# Patient Record
Sex: MALE | Race: WHITE | NOT HISPANIC OR LATINO | Employment: FULL TIME | URBAN - METROPOLITAN AREA
[De-identification: names, ages, dates, MRNs, and addresses within clinical notes are randomized per-mention and may not be internally consistent; named-entity substitution may affect disease eponyms.]

---

## 2017-11-03 ENCOUNTER — RECORDS - HEALTHEAST (OUTPATIENT)
Dept: LAB | Facility: CLINIC | Age: 47
End: 2017-11-03

## 2017-11-03 LAB
CHOLEST SERPL-MCNC: 165 MG/DL
FASTING STATUS PATIENT QL REPORTED: NORMAL
HDLC SERPL-MCNC: 52 MG/DL
LDLC SERPL CALC-MCNC: 84 MG/DL
TRIGL SERPL-MCNC: 144 MG/DL

## 2019-11-04 ENCOUNTER — RECORDS - HEALTHEAST (OUTPATIENT)
Dept: LAB | Facility: CLINIC | Age: 49
End: 2019-11-04

## 2019-11-04 LAB
ALBUMIN SERPL-MCNC: 4.1 G/DL (ref 3.5–5)
ALP SERPL-CCNC: 85 U/L (ref 45–120)
ALT SERPL W P-5'-P-CCNC: 40 U/L (ref 0–45)
ANION GAP SERPL CALCULATED.3IONS-SCNC: 7 MMOL/L (ref 5–18)
AST SERPL W P-5'-P-CCNC: 27 U/L (ref 0–40)
BILIRUB SERPL-MCNC: 0.4 MG/DL (ref 0–1)
BUN SERPL-MCNC: 17 MG/DL (ref 8–22)
CALCIUM SERPL-MCNC: 9.5 MG/DL (ref 8.5–10.5)
CHLORIDE BLD-SCNC: 106 MMOL/L (ref 98–107)
CHOLEST SERPL-MCNC: 176 MG/DL
CO2 SERPL-SCNC: 27 MMOL/L (ref 22–31)
CREAT SERPL-MCNC: 1 MG/DL (ref 0.7–1.3)
FASTING STATUS PATIENT QL REPORTED: ABNORMAL
GFR SERPL CREATININE-BSD FRML MDRD: >60 ML/MIN/1.73M2
GLUCOSE BLD-MCNC: 94 MG/DL (ref 70–125)
HDLC SERPL-MCNC: 48 MG/DL
LDLC SERPL CALC-MCNC: 80 MG/DL
POTASSIUM BLD-SCNC: 4 MMOL/L (ref 3.5–5)
PROT SERPL-MCNC: 6.5 G/DL (ref 6–8)
SODIUM SERPL-SCNC: 140 MMOL/L (ref 136–145)
TRIGL SERPL-MCNC: 239 MG/DL

## 2020-11-04 ENCOUNTER — RECORDS - HEALTHEAST (OUTPATIENT)
Dept: LAB | Facility: CLINIC | Age: 50
End: 2020-11-04

## 2020-11-04 LAB
CHOLEST SERPL-MCNC: 185 MG/DL
FASTING STATUS PATIENT QL REPORTED: ABNORMAL
HDLC SERPL-MCNC: 49 MG/DL
LDLC SERPL CALC-MCNC: 92 MG/DL
TRIGL SERPL-MCNC: 218 MG/DL

## 2022-06-17 ENCOUNTER — LAB REQUISITION (OUTPATIENT)
Dept: LAB | Facility: CLINIC | Age: 52
End: 2022-06-17
Payer: COMMERCIAL

## 2022-06-17 DIAGNOSIS — E78.49 OTHER HYPERLIPIDEMIA: ICD-10-CM

## 2022-06-17 LAB
CHOLEST SERPL-MCNC: 158 MG/DL
HDLC SERPL-MCNC: 41 MG/DL
LDLC SERPL CALC-MCNC: 82 MG/DL
TRIGL SERPL-MCNC: 175 MG/DL

## 2022-06-17 PROCEDURE — 80061 LIPID PANEL: CPT | Mod: ORL | Performed by: PHYSICIAN ASSISTANT

## 2022-08-25 ENCOUNTER — LAB REQUISITION (OUTPATIENT)
Dept: LAB | Facility: CLINIC | Age: 52
End: 2022-08-25
Payer: COMMERCIAL

## 2022-08-25 DIAGNOSIS — R53.82 CHRONIC FATIGUE, UNSPECIFIED: ICD-10-CM

## 2022-08-25 DIAGNOSIS — M79.10 MYALGIA, UNSPECIFIED SITE: ICD-10-CM

## 2022-08-25 DIAGNOSIS — Z12.5 ENCOUNTER FOR SCREENING FOR MALIGNANT NEOPLASM OF PROSTATE: ICD-10-CM

## 2022-08-25 LAB
ALBUMIN SERPL BCG-MCNC: 4.6 G/DL (ref 3.5–5.2)
ALP SERPL-CCNC: 85 U/L (ref 40–129)
ALT SERPL W P-5'-P-CCNC: 80 U/L (ref 10–50)
ANION GAP SERPL CALCULATED.3IONS-SCNC: 9 MMOL/L (ref 7–15)
AST SERPL W P-5'-P-CCNC: 42 U/L (ref 10–50)
BASOPHILS # BLD AUTO: 0 10E3/UL (ref 0–0.2)
BASOPHILS NFR BLD AUTO: 1 %
BILIRUB SERPL-MCNC: 0.3 MG/DL
BUN SERPL-MCNC: 20.1 MG/DL (ref 6–20)
CALCIUM SERPL-MCNC: 9.5 MG/DL (ref 8.6–10)
CHLORIDE SERPL-SCNC: 103 MMOL/L (ref 98–107)
CK SERPL-CCNC: 73 U/L (ref 39–308)
CREAT SERPL-MCNC: 0.98 MG/DL (ref 0.67–1.17)
DEPRECATED HCO3 PLAS-SCNC: 28 MMOL/L (ref 22–29)
EOSINOPHIL # BLD AUTO: 0.2 10E3/UL (ref 0–0.7)
EOSINOPHIL NFR BLD AUTO: 3 %
ERYTHROCYTE [DISTWIDTH] IN BLOOD BY AUTOMATED COUNT: 12 % (ref 10–15)
GFR SERPL CREATININE-BSD FRML MDRD: >90 ML/MIN/1.73M2
GLUCOSE SERPL-MCNC: 99 MG/DL (ref 70–99)
HCT VFR BLD AUTO: 42.4 % (ref 40–53)
HGB BLD-MCNC: 14.4 G/DL (ref 13.3–17.7)
IMM GRANULOCYTES # BLD: 0 10E3/UL
IMM GRANULOCYTES NFR BLD: 0 %
LYMPHOCYTES # BLD AUTO: 1.9 10E3/UL (ref 0.8–5.3)
LYMPHOCYTES NFR BLD AUTO: 29 %
MCH RBC QN AUTO: 29.4 PG (ref 26.5–33)
MCHC RBC AUTO-ENTMCNC: 34 G/DL (ref 31.5–36.5)
MCV RBC AUTO: 87 FL (ref 78–100)
MONOCYTES # BLD AUTO: 0.6 10E3/UL (ref 0–1.3)
MONOCYTES NFR BLD AUTO: 9 %
NEUTROPHILS # BLD AUTO: 3.7 10E3/UL (ref 1.6–8.3)
NEUTROPHILS NFR BLD AUTO: 58 %
NRBC # BLD AUTO: 0 10E3/UL
NRBC BLD AUTO-RTO: 0 /100
PLATELET # BLD AUTO: 344 10E3/UL (ref 150–450)
POTASSIUM SERPL-SCNC: 4.3 MMOL/L (ref 3.4–5.3)
PROT SERPL-MCNC: 6.4 G/DL (ref 6.4–8.3)
PSA SERPL-MCNC: 1.91 NG/ML (ref 0–3.5)
RBC # BLD AUTO: 4.9 10E6/UL (ref 4.4–5.9)
SODIUM SERPL-SCNC: 140 MMOL/L (ref 136–145)
TSH SERPL DL<=0.005 MIU/L-ACNC: 2.5 UIU/ML (ref 0.3–4.2)
WBC # BLD AUTO: 6.3 10E3/UL (ref 4–11)

## 2022-08-25 PROCEDURE — 84270 ASSAY OF SEX HORMONE GLOBUL: CPT | Mod: ORL | Performed by: FAMILY MEDICINE

## 2022-08-25 PROCEDURE — 82550 ASSAY OF CK (CPK): CPT | Mod: ORL | Performed by: FAMILY MEDICINE

## 2022-08-25 PROCEDURE — 84403 ASSAY OF TOTAL TESTOSTERONE: CPT | Mod: ORL | Performed by: FAMILY MEDICINE

## 2022-08-25 PROCEDURE — 85025 COMPLETE CBC W/AUTO DIFF WBC: CPT | Mod: ORL | Performed by: FAMILY MEDICINE

## 2022-08-25 PROCEDURE — G0103 PSA SCREENING: HCPCS | Mod: ORL | Performed by: FAMILY MEDICINE

## 2022-08-25 PROCEDURE — 80053 COMPREHEN METABOLIC PANEL: CPT | Mod: ORL | Performed by: FAMILY MEDICINE

## 2022-08-25 PROCEDURE — 84443 ASSAY THYROID STIM HORMONE: CPT | Mod: ORL | Performed by: FAMILY MEDICINE

## 2022-08-26 LAB — SHBG SERPL-SCNC: 35 NMOL/L (ref 11–80)

## 2022-08-29 LAB
TESTOST FREE SERPL-MCNC: 6.03 NG/DL
TESTOST SERPL-MCNC: 316 NG/DL (ref 240–950)

## 2023-11-25 ENCOUNTER — APPOINTMENT (OUTPATIENT)
Dept: ULTRASOUND IMAGING | Facility: HOSPITAL | Age: 53
DRG: 176 | End: 2023-11-25
Attending: INTERNAL MEDICINE
Payer: COMMERCIAL

## 2023-11-25 ENCOUNTER — HOSPITAL ENCOUNTER (INPATIENT)
Facility: HOSPITAL | Age: 53
LOS: 1 days | Discharge: HOME | DRG: 176 | End: 2023-11-26
Attending: EMERGENCY MEDICINE | Admitting: INTERNAL MEDICINE
Payer: COMMERCIAL

## 2023-11-25 ENCOUNTER — APPOINTMENT (OUTPATIENT)
Dept: CARDIOLOGY | Facility: HOSPITAL | Age: 53
DRG: 176 | End: 2023-11-25
Attending: INTERNAL MEDICINE
Payer: COMMERCIAL

## 2023-11-25 ENCOUNTER — APPOINTMENT (OUTPATIENT)
Dept: CT IMAGING | Facility: HOSPITAL | Age: 53
DRG: 176 | End: 2023-11-25
Attending: EMERGENCY MEDICINE
Payer: COMMERCIAL

## 2023-11-25 DIAGNOSIS — I26.94 MULTIPLE SUBSEGMENTAL PULMONARY EMBOLI WITHOUT ACUTE COR PULMONALE (H): ICD-10-CM

## 2023-11-25 DIAGNOSIS — I82.442 ACUTE DEEP VEIN THROMBOSIS (DVT) OF TIBIAL VEIN OF LEFT LOWER EXTREMITY (H): Primary | ICD-10-CM

## 2023-11-25 PROBLEM — E78.49 OTHER HYPERLIPIDEMIA: Status: ACTIVE | Noted: 2023-11-25

## 2023-11-25 PROBLEM — R53.82 CHRONIC FATIGUE: Status: ACTIVE | Noted: 2023-11-25

## 2023-11-25 PROBLEM — G57.62 MORTON'S NEUROMA OF LEFT FOOT: Status: ACTIVE | Noted: 2023-11-25

## 2023-11-25 PROBLEM — N52.9 MALE ERECTILE DYSFUNCTION, UNSPECIFIED: Status: ACTIVE | Noted: 2023-11-25

## 2023-11-25 LAB
ALBUMIN SERPL BCG-MCNC: 4.1 G/DL (ref 3.5–5.2)
ALP SERPL-CCNC: 83 U/L (ref 40–150)
ALT SERPL W P-5'-P-CCNC: 42 U/L (ref 0–70)
ANION GAP SERPL CALCULATED.3IONS-SCNC: 14 MMOL/L (ref 7–15)
AST SERPL W P-5'-P-CCNC: 36 U/L (ref 0–45)
BASOPHILS # BLD AUTO: 0 10E3/UL (ref 0–0.2)
BASOPHILS NFR BLD AUTO: 1 %
BILIRUB SERPL-MCNC: 0.4 MG/DL
BUN SERPL-MCNC: 27.5 MG/DL (ref 6–20)
CALCIUM SERPL-MCNC: 8.4 MG/DL (ref 8.6–10)
CHLORIDE SERPL-SCNC: 104 MMOL/L (ref 98–107)
CREAT SERPL-MCNC: 1.09 MG/DL (ref 0.67–1.17)
D DIMER PPP FEU-MCNC: 2.27 UG/ML FEU (ref 0–0.5)
DEPRECATED HCO3 PLAS-SCNC: 21 MMOL/L (ref 22–29)
EGFRCR SERPLBLD CKD-EPI 2021: 81 ML/MIN/1.73M2
EOSINOPHIL # BLD AUTO: 0.1 10E3/UL (ref 0–0.7)
EOSINOPHIL NFR BLD AUTO: 1 %
ERYTHROCYTE [DISTWIDTH] IN BLOOD BY AUTOMATED COUNT: 11.6 % (ref 10–15)
GLUCOSE SERPL-MCNC: 179 MG/DL (ref 70–99)
HCT VFR BLD AUTO: 40.1 % (ref 40–53)
HGB BLD-MCNC: 14.4 G/DL (ref 13.3–17.7)
IMM GRANULOCYTES # BLD: 0 10E3/UL
IMM GRANULOCYTES NFR BLD: 0 %
LIPASE SERPL-CCNC: 44 U/L (ref 13–60)
LVEF ECHO: NORMAL
LYMPHOCYTES # BLD AUTO: 1.6 10E3/UL (ref 0.8–5.3)
LYMPHOCYTES NFR BLD AUTO: 20 %
MCH RBC QN AUTO: 30.3 PG (ref 26.5–33)
MCHC RBC AUTO-ENTMCNC: 35.9 G/DL (ref 31.5–36.5)
MCV RBC AUTO: 84 FL (ref 78–100)
MONOCYTES # BLD AUTO: 0.6 10E3/UL (ref 0–1.3)
MONOCYTES NFR BLD AUTO: 7 %
NEUTROPHILS # BLD AUTO: 5.8 10E3/UL (ref 1.6–8.3)
NEUTROPHILS NFR BLD AUTO: 71 %
NRBC # BLD AUTO: 0 10E3/UL
NRBC BLD AUTO-RTO: 0 /100
NT-PROBNP SERPL-MCNC: 876 PG/ML (ref 0–900)
PLATELET # BLD AUTO: 235 10E3/UL (ref 150–450)
POTASSIUM SERPL-SCNC: 3.8 MMOL/L (ref 3.4–5.3)
PROT SERPL-MCNC: 6 G/DL (ref 6.4–8.3)
RADIOLOGIST FLAGS: ABNORMAL
RBC # BLD AUTO: 4.75 10E6/UL (ref 4.4–5.9)
SODIUM SERPL-SCNC: 139 MMOL/L (ref 135–145)
TROPONIN T SERPL HS-MCNC: 40 NG/L
TROPONIN T SERPL HS-MCNC: 58 NG/L
UFH PPP CHRO-ACNC: >1.1 IU/ML
WBC # BLD AUTO: 8.1 10E3/UL (ref 4–11)

## 2023-11-25 PROCEDURE — 999N000208 ECHOCARDIOGRAM COMPLETE

## 2023-11-25 PROCEDURE — 250N000013 HC RX MED GY IP 250 OP 250 PS 637: Performed by: INTERNAL MEDICINE

## 2023-11-25 PROCEDURE — 85520 HEPARIN ASSAY: CPT | Performed by: EMERGENCY MEDICINE

## 2023-11-25 PROCEDURE — 83880 ASSAY OF NATRIURETIC PEPTIDE: CPT | Performed by: EMERGENCY MEDICINE

## 2023-11-25 PROCEDURE — 84484 ASSAY OF TROPONIN QUANT: CPT | Performed by: EMERGENCY MEDICINE

## 2023-11-25 PROCEDURE — 85379 FIBRIN DEGRADATION QUANT: CPT | Performed by: EMERGENCY MEDICINE

## 2023-11-25 PROCEDURE — 93306 TTE W/DOPPLER COMPLETE: CPT | Mod: 26 | Performed by: INTERNAL MEDICINE

## 2023-11-25 PROCEDURE — 80053 COMPREHEN METABOLIC PANEL: CPT | Performed by: EMERGENCY MEDICINE

## 2023-11-25 PROCEDURE — 96365 THER/PROPH/DIAG IV INF INIT: CPT

## 2023-11-25 PROCEDURE — 255N000002 HC RX 255 OP 636: Performed by: EMERGENCY MEDICINE

## 2023-11-25 PROCEDURE — 36415 COLL VENOUS BLD VENIPUNCTURE: CPT | Performed by: EMERGENCY MEDICINE

## 2023-11-25 PROCEDURE — 210N000001 HC R&B IMCU HEART CARE

## 2023-11-25 PROCEDURE — 93970 EXTREMITY STUDY: CPT

## 2023-11-25 PROCEDURE — G0378 HOSPITAL OBSERVATION PER HR: HCPCS

## 2023-11-25 PROCEDURE — 258N000003 HC RX IP 258 OP 636: Performed by: EMERGENCY MEDICINE

## 2023-11-25 PROCEDURE — 71275 CT ANGIOGRAPHY CHEST: CPT

## 2023-11-25 PROCEDURE — 250N000011 HC RX IP 250 OP 636: Performed by: EMERGENCY MEDICINE

## 2023-11-25 PROCEDURE — 99221 1ST HOSP IP/OBS SF/LOW 40: CPT | Mod: AI | Performed by: INTERNAL MEDICINE

## 2023-11-25 PROCEDURE — 93005 ELECTROCARDIOGRAM TRACING: CPT | Performed by: EMERGENCY MEDICINE

## 2023-11-25 PROCEDURE — 250N000013 HC RX MED GY IP 250 OP 250 PS 637: Performed by: EMERGENCY MEDICINE

## 2023-11-25 PROCEDURE — 250N000011 HC RX IP 250 OP 636: Mod: JZ | Performed by: EMERGENCY MEDICINE

## 2023-11-25 PROCEDURE — 83690 ASSAY OF LIPASE: CPT | Performed by: EMERGENCY MEDICINE

## 2023-11-25 PROCEDURE — 85025 COMPLETE CBC W/AUTO DIFF WBC: CPT | Performed by: EMERGENCY MEDICINE

## 2023-11-25 PROCEDURE — 99222 1ST HOSP IP/OBS MODERATE 55: CPT | Performed by: INTERNAL MEDICINE

## 2023-11-25 PROCEDURE — 99285 EMERGENCY DEPT VISIT HI MDM: CPT | Mod: 25

## 2023-11-25 PROCEDURE — 96366 THER/PROPH/DIAG IV INF ADDON: CPT

## 2023-11-25 RX ORDER — SILDENAFIL 100 MG/1
100 TABLET, FILM COATED ORAL DAILY PRN
COMMUNITY

## 2023-11-25 RX ORDER — ATORVASTATIN CALCIUM 10 MG/1
10 TABLET, FILM COATED ORAL AT BEDTIME
COMMUNITY
Start: 2021-12-15

## 2023-11-25 RX ORDER — ASPIRIN 325 MG
325 TABLET ORAL ONCE
Status: COMPLETED | OUTPATIENT
Start: 2023-11-25 | End: 2023-11-25

## 2023-11-25 RX ORDER — MULTIVITAMIN,THERAPEUTIC
1 TABLET ORAL AT BEDTIME
COMMUNITY
End: 2024-01-09

## 2023-11-25 RX ORDER — NAPROXEN SODIUM 220 MG
2 TABLET ORAL DAILY PRN
Status: ON HOLD | COMMUNITY
End: 2023-11-26

## 2023-11-25 RX ORDER — IOPAMIDOL 755 MG/ML
75 INJECTION, SOLUTION INTRAVASCULAR ONCE
Status: COMPLETED | OUTPATIENT
Start: 2023-11-25 | End: 2023-11-25

## 2023-11-25 RX ORDER — ATORVASTATIN CALCIUM 10 MG/1
10 TABLET, FILM COATED ORAL AT BEDTIME
Status: DISCONTINUED | OUTPATIENT
Start: 2023-11-25 | End: 2023-11-26 | Stop reason: HOSPADM

## 2023-11-25 RX ORDER — HEPARIN SODIUM 10000 [USP'U]/100ML
0-5000 INJECTION, SOLUTION INTRAVENOUS CONTINUOUS
Status: DISPENSED | OUTPATIENT
Start: 2023-11-25 | End: 2023-11-25

## 2023-11-25 RX ADMIN — PERFLUTREN 2 ML: 6.52 INJECTION, SUSPENSION INTRAVENOUS at 14:10

## 2023-11-25 RX ADMIN — ASPIRIN 325 MG ORAL TABLET 325 MG: 325 PILL ORAL at 10:46

## 2023-11-25 RX ADMIN — RIVAROXABAN 15 MG: 15 TABLET, FILM COATED ORAL at 21:47

## 2023-11-25 RX ADMIN — HEPARIN SODIUM AND DEXTROSE 1700 UNITS/HR: 10000; 5 INJECTION INTRAVENOUS at 12:49

## 2023-11-25 RX ADMIN — ATORVASTATIN CALCIUM 10 MG: 10 TABLET, FILM COATED ORAL at 20:07

## 2023-11-25 RX ADMIN — SODIUM CHLORIDE 500 ML: 9 INJECTION, SOLUTION INTRAVENOUS at 10:47

## 2023-11-25 RX ADMIN — IOPAMIDOL 75 ML: 755 INJECTION, SOLUTION INTRAVENOUS at 11:34

## 2023-11-25 ASSESSMENT — ACTIVITIES OF DAILY LIVING (ADL)
ADLS_ACUITY_SCORE: 35
ADLS_ACUITY_SCORE: 18
ADLS_ACUITY_SCORE: 35
ADLS_ACUITY_SCORE: 18
ADLS_ACUITY_SCORE: 35
DEPENDENT_IADLS:: INDEPENDENT
ADLS_ACUITY_SCORE: 18
ADLS_ACUITY_SCORE: 18

## 2023-11-25 NOTE — ED PROVIDER NOTES
EMERGENCY DEPARTMENT ENCOUNTER      NAME: Alan Thapa  AGE: 53 year old male  YOB: 1970  MRN: [id number]  EVALUATION DATE & TIME: 11/25/2023 10:25 AM    PCP: No primary care provider on file.    ED PROVIDER: Abi Van MD      Chief Complaint   Patient presents with    Chest Pain    Shortness of Breath         FINAL IMPRESSION:  1. Multiple subsegmental pulmonary emboli without acute cor pulmonale (H)          ED COURSE & MEDICAL DECISION MAKING:    Pertinent Labs & Imaging studies reviewed. (See chart for details)    10:30 AM I introduced myself to the patient, obtained patient history, performed a physical exam, and discussed plan for ED workup including potential diagnostic laboratory/imaging studies and interventions.  12:27 PM I rechecked and updated the patient.  12:31 PM I spoke to Dr. Minal Thompson, Hospitalist, who agrees to take the patient.  12:52 PM I spoke to Dr. Figueredo, IR.    53 year old male with a pertinent history of high cholesterol who presents to this ED for evaluation of chest pain and shortness of breath.  On exam, patient is overall well-appearing in no acute distress.  Vital signs here are within normal limits and he is afebrile.  Differential diagnosis includes but is not limited to acute coronary syndrome, PE, aortic dissection, pneumonia, pulmonary edema, musculoskeletal etiology, GERD, etc.  EKG reveals Q waves in the inferior leads without signs of ST elevation or depression.  There is also an incomplete right bundle branch block.  Do not have an old EKG to compare to.  Did consider potential PE with these EKG changes.  D-dimer obtained which was elevated at 2.27.  CMP largely unremarkable.  CBC reveals a normal white blood cell of 8.1 with hemoglobin 14.4.  CT of the chest obtained.  Lipase within normal limits.  .  Initial troponin was 40 and on repeat is 58.  Repeat EKG without signs of acute ischemia.  Given 3 intrafamily grams of aspirin here.  Had  initially started gentle IV fluids which were stopped once notified that the CT showed PE. No evidence of dissection.     CT shows bilateral lobar interlobar and segmental pulmonary emboli with moderate clot burden.  No sign of saddle PE per discussion with the radiologist.  There is some CT evidence of right heart strain.  IV heparin bolus and drip started.  Discussed the patient with the hospitalist who accepted for admission and asked that I speak with IR.  Discussed with IR as below.  Patient in agreement with the plan for admission.  Patient was admitted in stable condition. He remained hemodynamically stable.     ED Course as of 11/25/23 1255   Sat Nov 25, 2023   1252 Spoke with Dr. Figueredo with IR who did not feel that the patient would warrant any emergent IR intervention.  He did not feel that he would warrant lytics at this point with this being peripheral and being hemodynamically stable.  Recommended getting the echocardiogram and if there was signs of significant heart strain on this to have the hospitalist contact him back to evaluate the patient but he was comfortable with the patient staying here at [facility].       At the conclusion of the encounter I discussed the results of all of the tests and the disposition. The questions were answered. The patient or family acknowledged understanding and was agreeable with the care plan.         Medical Decision Making    History:  Supplemental history from: Family Member/Significant Other  External Record(s) reviewed: Documented in chart, if applicable.    Work Up:  Chart documentation includes differential considered and any EKGs or imaging independently interpreted by provider.  In additional to work up documented, I considered the following work up: Documented in chart, if applicable.    External consultation:  Discussion of management with another provider: Hospitalist and Interventional Radiology    Complicating factors:  Care impacted by chronic  "illness: Other: high cholesterol  Care affected by social determinants of health: N/A    Disposition considerations: Admit.      MEDICATIONS GIVEN IN THE EMERGENCY:  Medications   heparin infusion 25,000 units in D5W 250 mL ANTICOAGULANT (1,700 Units/hr Intravenous $New Bag 23 1249)   aspirin (ASA) tablet 325 mg (325 mg Oral $Given 23 1046)   sodium chloride 0.9% BOLUS 500 mL (0 mLs Intravenous Stopped 23 1113)   iopamidol (ISOVUE-370) solution 75 mL (75 mLs Intravenous $Given 23 1134)   heparin ANTICOAGULANT Loading dose for HIGH INTENSITY TREATMENT * Give BEFORE starting heparin infusion (7,600 Units Intravenous $Given 23 1248)       NEW PRESCRIPTIONS STARTED AT TODAY'S ER VISIT  New Prescriptions    No medications on file          =================================================================    HPI    Patient information was obtained from: Patient and patient's wife    Use of : N/A         Alan Thapa is a 53 year old male with a pertinent history of high cholesterol who presents to this ED for evaluation of chest pain and shortness of breath.    Patient reports playing tennis both last night and this morning when he experienced some chest pain, says it felt \"sharper\" and like a muscle strain. He notes that it went away in between these episodes but the one this morning was accompanied by some lightheadedness around 9:30 AM. He said he had some shortness of breath and sweating during these episodes as well, but thought it was just due to playing tennis. Patient notes that he has felt winded for the past couple days and is leaving Wednesday to travel out of the country. He notes that his maternal grandfather  in his 40 - 50s due to heart issues and that he was a smoker. Otherwise, patient denies any blood clots, leg swelling, palpitations, family history of atrial fibrillation, syncope, pain radiation, experiencing anything like this before, any numbness, " "tingling, weakness, abdominal pain, nausea, fever, cough, vomiting, diarrhea, heart issues, family history of heart attack, smoking, diabetes, or any recent travel. No other complaints at this time.       REVIEW OF SYSTEMS   Review of Systems   Pertinent positives and negatives are documented in the HPI. All other systems reviewed and are negative.      PAST MEDICAL HISTORY:  No past medical history on file.    PAST SURGICAL HISTORY:  No past surgical history on file.        CURRENT MEDICATIONS:    atorvastatin (LIPITOR) 10 MG tablet  multivitamin, therapeutic (THERA-VIT) TABS tablet  naproxen sodium (ANAPROX) 220 MG tablet  sildenafil (VIAGRA) 100 MG tablet        ALLERGIES:  No Known Allergies    FAMILY HISTORY:  No family history on file.    SOCIAL HISTORY:        VITALS:  /85   Pulse 74   Temp 97.7  F (36.5  C) (Oral)   Resp 22   Ht 1.854 m (6' 1\")   Wt 95.3 kg (210 lb)   SpO2 97%   BMI 27.71 kg/m      PHYSICAL EXAM    Physical Exam  Constitutional: Well developed, Well nourished, NAD, GCS 15  HENT: Normocephalic, Atraumatic, Bilateral external ears normal, Oropharynx normal, mucous membranes moist, Nose normal. Neck-  Normal range of motion, No tenderness, Supple, No stridor.    Eyes: PERRL, EOMI, Conjunctiva normal, No discharge.   Respiratory: Normal breath sounds, No respiratory distress, No wheezing or crackles, Speaks in full sentences easily.    Cardiovascular: Normal heart rate, Regular rhythm,  No murmurs, No rubs, No gallops. 2+ radial pulses bilaterally  GI: Bowel sounds normal, Soft, No tenderness, No masses, No rebound or guarding.   Musculoskeletal: 2+ DP pulses. No notable lower extremity edema.  No cyanosis, No clubbing. Good range of motion in all major joints. No tenderness to palpation or major deformities noted.  Integument: Warm, Dry, No erythema, No rash. No petechiae.   Neurologic: Alert & oriented x 3, 5/5 strength in all 4 extremities bilaterally. Sensation intact to light " touch in all 4 extremities and the face bilaterally. No focal deficits noted.   Psychiatric: Affect normal, Judgment normal, Mood normal. Cooperative.      LAB:  All pertinent labs reviewed and interpreted.  Results for orders placed or performed during the hospital encounter of 11/25/23   CT Chest Pulmonary Embolism w Contrast   Result Value Ref Range    Radiologist flags New diagnosis of pulmonary embolism (AA)     Impression    IMPRESSION:  1.  Bilateral lobar, interlobar, and segmental pulmonary emboli with moderate clot burden.  2.  Evidence of right heart strain.  3.  3 mm perifissural nodule in the left lower lobe. No follow-up needed per Fleischner Society guidelines.      [Critical Result: New diagnosis of pulmonary embolism]    Finding was identified on 11/25/2023 12:08 PM CST.       Dr. Van was contacted by me on 11/25/2023 12:13 PM CST and verbalized understanding of the critical result.      Comprehensive metabolic panel   Result Value Ref Range    Sodium 139 135 - 145 mmol/L    Potassium 3.8 3.4 - 5.3 mmol/L    Carbon Dioxide (CO2) 21 (L) 22 - 29 mmol/L    Anion Gap 14 7 - 15 mmol/L    Urea Nitrogen 27.5 (H) 6.0 - 20.0 mg/dL    Creatinine 1.09 0.67 - 1.17 mg/dL    GFR Estimate 81 >60 mL/min/1.73m2    Calcium 8.4 (L) 8.6 - 10.0 mg/dL    Chloride 104 98 - 107 mmol/L    Glucose 179 (H) 70 - 99 mg/dL    Alkaline Phosphatase 83 40 - 150 U/L    AST 36 0 - 45 U/L    ALT 42 0 - 70 U/L    Protein Total 6.0 (L) 6.4 - 8.3 g/dL    Albumin 4.1 3.5 - 5.2 g/dL    Bilirubin Total 0.4 <=1.2 mg/dL   Result Value Ref Range    Troponin T, High Sensitivity 40 (H) <=22 ng/L   Nt probnp inpatient (BNP)   Result Value Ref Range    N terminal Pro BNP Inpatient 876 0 - 900 pg/mL   Result Value Ref Range    Lipase 44 13 - 60 U/L   D dimer quantitative   Result Value Ref Range    D-Dimer Quantitative 2.27 (H) 0.00 - 0.50 ug/mL FEU   CBC with platelets and differential   Result Value Ref Range    WBC Count 8.1 4.0 - 11.0  10e3/uL    RBC Count 4.75 4.40 - 5.90 10e6/uL    Hemoglobin 14.4 13.3 - 17.7 g/dL    Hematocrit 40.1 40.0 - 53.0 %    MCV 84 78 - 100 fL    MCH 30.3 26.5 - 33.0 pg    MCHC 35.9 31.5 - 36.5 g/dL    RDW 11.6 10.0 - 15.0 %    Platelet Count 235 150 - 450 10e3/uL    % Neutrophils 71 %    % Lymphocytes 20 %    % Monocytes 7 %    % Eosinophils 1 %    % Basophils 1 %    % Immature Granulocytes 0 %    NRBCs per 100 WBC 0 <1 /100    Absolute Neutrophils 5.8 1.6 - 8.3 10e3/uL    Absolute Lymphocytes 1.6 0.8 - 5.3 10e3/uL    Absolute Monocytes 0.6 0.0 - 1.3 10e3/uL    Absolute Eosinophils 0.1 0.0 - 0.7 10e3/uL    Absolute Basophils 0.0 0.0 - 0.2 10e3/uL    Absolute Immature Granulocytes 0.0 <=0.4 10e3/uL    Absolute NRBCs 0.0 10e3/uL       RADIOLOGY:  Reviewed all pertinent imaging. Please see official radiology report.  CT Chest Pulmonary Embolism w Contrast   Final Result   Abnormal   IMPRESSION:   1.  Bilateral lobar, interlobar, and segmental pulmonary emboli with moderate clot burden.   2.  Evidence of right heart strain.   3.  3 mm perifissural nodule in the left lower lobe. No follow-up needed per Fleischner Society guidelines.         [Critical Result: New diagnosis of pulmonary embolism]      Finding was identified on 11/25/2023 12:08 PM CST.          Dr. Van was contacted by me on 11/25/2023 12:13 PM CST and verbalized understanding of the critical result.          Echocardiogram Complete    (Results Pending)       EKG:    Performed at: [facility] on 11/25/23 at 10:20 am    Impression: normal sinus rhythm. Inferior infarct, age undetermined. Incomplete RBBB  Rate: 85  Rhythm: sinus rhythm  Axis: right  AZ Interval: 170  QRS Interval: 104  QTc Interval: 440  ST Changes: Q waves in III and avF  Comparison: none    Performed at: [facility] on 11/25/23 at 11:10 am    Impression: normal sinus rhythm. Inferior infarct, age undetermined. Incomplete RBBB.    Rate: 71  Rhythm: sinus rhythm  Axis:  right  WI Interval: 172  QRS Interval: 98  QTc Interval: 443  ST Changes: Q waves in III and avF  Comparison: unchanged from first EKG today    I have independently reviewed and interpreted the EKG(s) documented above.    PROCEDURES:   None      [facility] Documentation:   CMS Diagnoses:               I, Violeta Martin, am serving as a scribe to document services personally performed by Abi Van MD based on my observation and the provider's statements to me. I, Abi Van MD, attest that Violeta Martin is acting in a scribe capacity, has observed my performance of the services and has documented them in accordance with my direction.    Abi Van MD  [facility] EMERGENCY DEPARTMENT  [facility] [facility] [id number] [phone]     Abi Van MD  12/16/23 2191

## 2023-11-25 NOTE — ED TRIAGE NOTES
"Pt presents with left anterior/mid sternal chest pain. Pt reports he got short of breath when it occurred. Chest pain started last evening with physical activity. Today it reoccurred with activity and pt states \"things got black and I almost passed out.\" No cardiac hx. Hx of hyperlipidemia.     Triage Assessment (Adult)       Row Name 11/25/23 1022          Triage Assessment    Airway WDL WDL        Respiratory WDL    Respiratory WDL X;rhythm/pattern     Rhythm/Pattern, Respiratory shortness of breath  With chest pain        Skin Circulation/Temperature WDL    Skin Circulation/Temperature WDL WDL        Cardiac WDL    Cardiac WDL chest pain;X        Chest Pain Assessment    Chest Pain Location midsternal;anterior chest, left     Character aching;pressure     Associated Signs/Symptoms dyspnea        Peripheral/Neurovascular WDL    Peripheral Neurovascular WDL WDL        Cognitive/Neuro/Behavioral WDL    Cognitive/Neuro/Behavioral WDL WDL                     "

## 2023-11-25 NOTE — H&P
Admission History and Physical   Alan Thapa, 1970, [id number]  [facility]  No admission diagnoses are documented for this encounter.  PCP:System, Provider Not In, None   Admitting provider: Mandi Allen MD.    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  - consulted Heme since unprovoked and may be from sedentary at work but he is active outside of work   - Echo pending  - I did have ED provider contact IR to ensure no lytics needed and appreciate their assistance   - discharge pharmacy to run DOAC     DVT   - US - Short segment nonocclusive deep venous thrombosis of a left posterior tibial vein in the calf. No evidence of deep venous thrombosis of the right lower extremity.  - treating as above     HLP  - resume homemeds     COVID-19 PCR Results           No data to display              COVID-19 Antibody Results, Testing for Immunity           No data to display              VTE prophylaxis:  IV heparin  DIET: Orders Placed This Encounter      Regular Diet Adult    Drains/Lines: none  Weight bearing status: WBAt  Disposition/Barriers to discharge: pending further work-up   Code Status:Full Code    HPI: Alan Thapa is a 53 year old male with h/o high cholesterol who presents to this ED for evaluation of  "chest pain and shortness of breath.     Patient reports playing tennis both last night and this morning when he experienced some chest pain, says it felt \"sharper\" and like a muscle strain. He notes that it went away in between these episodes but the one this morning was accompanied by some lightheadedness around 9:30 AM. He said he had some shortness of breath and sweating during these episodes as well, but thought it was just due to playing tennis. Patient notes that he has felt winded for the past couple days and is leaving Wednesday to travel out of the country. He notes that his maternal grandfather  in his 40 - 50s due to heart issues and that he was a smoker. Otherwise, patient denies any blood clots, leg swelling, palpitations, family history of atrial fibrillation, syncope, pain radiation, experiencing anything like this before, any numbness, tingling, weakness, abdominal pain, nausea, fever, cough, vomiting, diarrhea, heart issues, family history of heart attack, smoking, diabetes, or any recent travel. No other complaints at this time.     Past Medical History:   Diagnosis Date    Chronic fatigue 2023    Male erectile dysfunction, unspecified 2023    Pizano's neuroma of left foot 2023    Other hyperlipidemia 2023     No past surgical history on file.  No family history on file.  Social History     Socioeconomic History    Marital status:      Spouse name: Not on file    Number of children: Not on file    Years of education: Not on file    Highest education level: Not on file   Occupational History    Not on file   Tobacco Use    Smoking status: Not on file    Smokeless tobacco: Not on file   Substance and Sexual Activity    Alcohol use: Not on file    Drug use: Not on file    Sexual activity: Not on file   Other Topics Concern    Not on file   Social History Narrative    Not on file     Social Determinants of Health     Financial Resource Strain: Not on file   Food " Insecurity: Not on file   Transportation Needs: Not on file   Physical Activity: Not on file   Stress: Not on file   Social Connections: Not on file   Interpersonal Safety: Not on file   Housing Stability: Not on file     No Known Allergies    PRIOR TO ADMISSION MEDICATIONS   Prior to Admission medications    Medication Sig Last Dose Taking? Auth Provider Long Term End Date   atorvastatin (LIPITOR) 10 MG tablet Take 10 mg by mouth at bedtime 11/24/2023 at HS Yes Unknown, Entered By History Yes    multivitamin, therapeutic (THERA-VIT) TABS tablet Take 1 tablet by mouth at bedtime 11/24/2023 at HS Yes Unknown, Entered By History     naproxen sodium (ANAPROX) 220 MG tablet Take 2 tablets by mouth daily as needed for moderate pain 11/25/2023 at AM Yes Unknown, Entered By History     sildenafil (VIAGRA) 100 MG tablet Take 100 mg by mouth daily as needed (Erectile dysfunction) More than a month at PRN Yes Unknown, Entered By History Yes         REVIEW OF SYSTEMS:  12 point reviewed pertinent negatives and positives in HPI all others negative     PHYSICAL EXAM  Temp:  [97.7  F (36.5  C)] 97.7  F (36.5  C)  Pulse:  [68-86] 68  Resp:  [20-27] 20  BP: ()/(59-85) 115/76  SpO2:  [95 %-99 %] 96 %  Wt Readings from Last 1 Encounters:   11/25/23 95.3 kg (210 lb)     Body mass index is 27.71 kg/m .  Physical Exam  Constitutional:       Appearance: Normal appearance.   HENT:      Head: Normocephalic and atraumatic.      Nose: Nose normal.      Mouth/Throat:      Mouth: Mucous membranes are moist.      Pharynx: Oropharynx is clear.   Eyes:      Extraocular Movements: Extraocular movements intact.      Conjunctiva/sclera: Conjunctivae normal.      Pupils: Pupils are equal, round, and reactive to light.   Neck:      Thyroid: No thyromegaly.      Vascular: No carotid bruit or JVD.      Trachea: Trachea and phonation normal.   Cardiovascular:      Rate and Rhythm: Normal rate and regular rhythm.      Pulses: Normal pulses.       Heart sounds: Normal heart sounds.   Pulmonary:      Effort: Pulmonary effort is normal.      Breath sounds: Normal breath sounds.   Abdominal:      General: Bowel sounds are normal. There is no distension.      Palpations: Abdomen is soft.      Tenderness: There is no abdominal tenderness. There is no guarding.   Musculoskeletal:         General: Normal range of motion.      Cervical back: Normal range of motion and neck supple. No rigidity or tenderness.      Right lower leg: No edema.      Left lower leg: No edema.   Lymphadenopathy:      Cervical: No cervical adenopathy.   Skin:     General: Skin is warm and dry.      Capillary Refill: Capillary refill takes less than 2 seconds.   Neurological:      General: No focal deficit present.      Mental Status: He is alert and oriented to person, place, and time. Mental status is at baseline.   Psychiatric:         Mood and Affect: Mood normal.         Behavior: Behavior normal.         PERTINENT LABS and RADIOLOGY   Results for orders placed or performed during the hospital encounter of 11/25/23   CT Chest Pulmonary Embolism w Contrast   Result Value Ref Range    Radiologist flags New diagnosis of pulmonary embolism (AA)     Impression    IMPRESSION:  1.  Bilateral lobar, interlobar, and segmental pulmonary emboli with moderate clot burden.  2.  Evidence of right heart strain.  3.  3 mm perifissural nodule in the left lower lobe. No follow-up needed per Fleischner Society guidelines.      [Critical Result: New diagnosis of pulmonary embolism]    Finding was identified on 11/25/2023 12:08 PM CST.       Dr. Van was contacted by me on 11/25/2023 12:13 PM CST and verbalized understanding of the critical result.          Imaging results reviewed over the past 24 hrs:   Recent Results (from the past 24 hour(s))   CT Chest Pulmonary Embolism w Contrast   Result Value    Radiologist flags New diagnosis of pulmonary embolism (AA)    Narrative    EXAM: CT CHEST PULMONARY  EMBOLISM W CONTRAST  LOCATION: [facility]  DATE: 11/25/2023    INDICATION: elevated d dimer, chest pain, near syncope, eval for PE, etc  COMPARISON: None.  TECHNIQUE: CT chest pulmonary angiogram during arterial phase injection of IV contrast. Multiplanar reformats and MIP reconstructions were performed. Dose reduction techniques were used.   CONTRAST: 75ml isovue 370    FINDINGS:  ANGIOGRAM CHEST: Bilateral lobar, interlobar, and segmental pulmonary emboli with moderate clot burden. Elevation of the RV/LV ratio suggesting heart strain. Thoracic aorta is normal caliber. No evidence of dissection.    LUNGS AND PLEURA: Mild dependent atelectasis. No pleural effusions. 3 mm perifissural nodule in the left lower lobe on series 6 image 151. This is likely an intrapulmonary node. No follow-up is needed.    MEDIASTINUM/AXILLAE: Normal heart size. No pericardial effusions. No adenopathy.    CORONARY ARTERY CALCIFICATION: Not well assessed. None identified.    UPPER ABDOMEN: Normal.    MUSCULOSKELETAL: Normal.      Impression    IMPRESSION:  1.  Bilateral lobar, interlobar, and segmental pulmonary emboli with moderate clot burden.  2.  Evidence of right heart strain.  3.  3 mm perifissural nodule in the left lower lobe. No follow-up needed per Fleischner Society guidelines.      [Critical Result: New diagnosis of pulmonary embolism]    Finding was identified on 11/25/2023 12:08 PM CST.       Dr. Van was contacted by me on 11/25/2023 12:13 PM CST and verbalized understanding of the critical result.      US Lower Extremity Venous Duplex Bilateral    Narrative    EXAM: US LOWER EXTREMITY VENOUS DUPLEX BILATERAL  LOCATION: [facility]  DATE: 11/25/2023    INDICATION: Pulmonary embolism.  COMPARISON: None.  TECHNIQUE: Venous Duplex ultrasound of bilateral lower extremities with and without compression, augmentation and duplex. Color flow and spectral Doppler with waveform  analysis performed.    FINDINGS: Exam includes the common femoral, femoral, popliteal veins as well as segmentally visualized deep calf veins and greater saphenous vein.     RIGHT: No deep vein thrombosis. No superficial thrombophlebitis. No popliteal cyst.    LEFT: Short segment of acute nonocclusive thrombus in one of the paired posterior tibial veins at the mid calf. No superficial thrombophlebitis. No popliteal cyst.      Impression    IMPRESSION:  1.  Short segment nonocclusive deep venous thrombosis of a left posterior tibial vein in the calf.  2.  No evidence of deep venous thrombosis of the right lower extremity.         Recent Labs   Lab 11/25/23  1042   WBC 8.1   HGB 14.4   MCV 84         POTASSIUM 3.8   CHLORIDE 104   CO2 21*   BUN 27.5*   CR 1.09   ANIONGAP 14   KEN 8.4*   *   ALBUMIN 4.1   PROTTOTAL 6.0*   BILITOTAL 0.4   ALKPHOS 83   ALT 42   AST 36   LIPASE 44     EKG:sinus rhythm no acute changes on secon EKG,       Mandi Allen MD  [facility]  [phone]

## 2023-11-25 NOTE — CONSULTS
Care Management Initial Consult    General Information  Assessment completed with: Alan Brenner  Type of CM/SW Visit: Initial Assessment    Primary Care Provider verified and updated as needed: Yes   Readmission within the last 30 days: no previous admission in last 30 days      Reason for Consult: discharge planning  Advance Care Planning: Advance Care Planning Reviewed: no concerns identified          Communication Assessment  Patient's communication style: spoken language (English or Bilingual)          Living Environment:   People in home: spouse     Current living Arrangements: house      Able to return to prior arrangements: yes       Family/Social Support:  Care provided by: self  Provides care for: no one  Marital Status:   Wife  Lurdes       Description of Support System: Supportive, Involved    Support Assessment: Adequate family and caregiver support, Adequate social supports, Patient communicates needs well met    Current Resources:   Patient receiving home care services: No     Community Resources: None  Equipment currently used at home: none  Supplies currently used at home: None    Employment/Financial:  Employment Status: employed full-time        Financial Concerns:     Referral to Financial Worker: No       Does the patient's insurance plan have a 3 day qualifying hospital stay waiver?  No    Lifestyle & Psychosocial Needs:  Social Determinants of Health     Food Insecurity: Not on file   Depression: Not on file   Housing Stability: Not on file   Tobacco Use: Not on file   Financial Resource Strain: Not on file   Alcohol Use: Not on file   Transportation Needs: Not on file   Physical Activity: Not on file   Interpersonal Safety: Not on file   Stress: Not on file   Social Connections: Not on file       Functional Status:  Prior to admission patient needed assistance:   Dependent ADLs:: Independent, Ambulation-no assistive device  Dependent IADLs:: Independent       Mental Health Status:           Chemical Dependency Status:                Values/Beliefs:  Spiritual, Cultural Beliefs, Tenriism Practices, Values that affect care:                 Additional Information:  Alan lives in a house with his wife. He is independent with ADLs and IADLs. He works full time.    Likely home with no new needs at this time.    Family to transport at discharge.    CM to follow for medical progression of care, discharge recommendations, and final discharge plan.    Mariella David RN

## 2023-11-25 NOTE — ED NOTES
"[facility] ED Handoff Report    ED Chief Complaint: chest pain, shortness of breath    ED Diagnosis:  (I26.94) Multiple subsegmental pulmonary emboli without acute cor pulmonale (H)    PMH:  No past medical history on file.     Code Status:  No Order     Falls Risk: No Band: Not applicable    Current Living Situation/Residence: lives with a significant other and lives alone     Elimination Status: Continent: Yes     Activity Level: SBA    Patients Preferred Language:  English     Needed: No    Vital Signs:  /76   Pulse 68   Temp 97.7  F (36.5  C) (Oral)   Resp 20   Ht 1.854 m (6' 1\")   Wt 95.3 kg (210 lb)   SpO2 96%   BMI 27.71 kg/m       Cardiac Rhythm: NSR    Pain Score: 1/10    Is the Patient Confused:  No    Last Food or Drink: 11/25/23 at breakfast    Tests Performed: Done: Labs and Imaging    Family Dynamics/Concerns: NO    Family Updated On Visitor Policy: No    Plan of Care Communicated to Family: Yes    Who Was Updated about Plan of Care: wife    Belongings Checklist Done and Signed by Patient: No    Medications sent with patient: none    Covid: asymptomatic ,     Additional Information: Pt has hep drip going. Next hep Xa at 1800. A/o x 4. Pt at US for DVT.    RN: Tyler Harris RN   11/25/2023 3:10 PM      "

## 2023-11-25 NOTE — PHARMACY-ADMISSION MEDICATION HISTORY
Pharmacist Admission Medication History    Admission medication history is complete. The information provided in this note is only as accurate as the sources available at the time of the update.    Information Source(s): Patient and CareEverywhere/SureScripts via in-person    Pertinent Information: Patient states only prescription medication he takes on a regular basis is atorvastatin. Also has prescriptions for azithromycin and loperamide for an upcoming trip in case of travelers diarrhea.     Changes made to PTA medication list:  Added: all  Deleted: None  Changed: None    Medication Affordability:  Not including over the counter (OTC) medications, was there a time in the past 3 months when you did not take your medications as prescribed because of cost?: No    Allergies reviewed with patient and updates made in EHR: yes    Medication History Completed By: SHEFALI PAGAN [facility] 11/25/2023 11:09 AM    PTA Med List   Medication Sig Last Dose    atorvastatin (LIPITOR) 10 MG tablet Take 10 mg by mouth at bedtime 11/24/2023 at HS    multivitamin, therapeutic (THERA-VIT) TABS tablet Take 1 tablet by mouth at bedtime 11/24/2023 at HS    naproxen sodium (ANAPROX) 220 MG tablet Take 2 tablets by mouth daily as needed for moderate pain 11/25/2023 at AM    sildenafil (VIAGRA) 100 MG tablet Take 100 mg by mouth daily as needed (Erectile dysfunction) More than a month at PRN

## 2023-11-26 VITALS
HEIGHT: 73 IN | TEMPERATURE: 97.8 F | DIASTOLIC BLOOD PRESSURE: 66 MMHG | OXYGEN SATURATION: 93 % | SYSTOLIC BLOOD PRESSURE: 110 MMHG | RESPIRATION RATE: 18 BRPM | WEIGHT: 209 LBS | BODY MASS INDEX: 27.7 KG/M2 | HEART RATE: 71 BPM

## 2023-11-26 PROCEDURE — 99239 HOSP IP/OBS DSCHRG MGMT >30: CPT | Performed by: INTERNAL MEDICINE

## 2023-11-26 PROCEDURE — G0378 HOSPITAL OBSERVATION PER HR: HCPCS

## 2023-11-26 PROCEDURE — 250N000013 HC RX MED GY IP 250 OP 250 PS 637: Performed by: INTERNAL MEDICINE

## 2023-11-26 RX ORDER — HYDRALAZINE HYDROCHLORIDE 20 MG/ML
10 INJECTION INTRAMUSCULAR; INTRAVENOUS EVERY 4 HOURS PRN
Status: DISCONTINUED | OUTPATIENT
Start: 2023-11-26 | End: 2023-11-26 | Stop reason: HOSPADM

## 2023-11-26 RX ORDER — ACETAMINOPHEN 325 MG/1
650 TABLET ORAL EVERY 4 HOURS PRN
Status: DISCONTINUED | OUTPATIENT
Start: 2023-11-26 | End: 2023-11-26 | Stop reason: HOSPADM

## 2023-11-26 RX ORDER — CALCIUM CARBONATE 500 MG/1
500 TABLET, CHEWABLE ORAL 3 TIMES DAILY PRN
Status: DISCONTINUED | OUTPATIENT
Start: 2023-11-26 | End: 2023-11-26 | Stop reason: HOSPADM

## 2023-11-26 RX ORDER — LANOLIN ALCOHOL/MO/W.PET/CERES
3 CREAM (GRAM) TOPICAL
Status: DISCONTINUED | OUTPATIENT
Start: 2023-11-26 | End: 2023-11-26 | Stop reason: HOSPADM

## 2023-11-26 RX ORDER — AMOXICILLIN 250 MG
1 CAPSULE ORAL 2 TIMES DAILY PRN
Status: DISCONTINUED | OUTPATIENT
Start: 2023-11-26 | End: 2023-11-26 | Stop reason: HOSPADM

## 2023-11-26 RX ORDER — ONDANSETRON 2 MG/ML
4 INJECTION INTRAMUSCULAR; INTRAVENOUS EVERY 6 HOURS PRN
Status: DISCONTINUED | OUTPATIENT
Start: 2023-11-26 | End: 2023-11-26 | Stop reason: HOSPADM

## 2023-11-26 RX ORDER — BISACODYL 5 MG
5 TABLET, DELAYED RELEASE (ENTERIC COATED) ORAL DAILY PRN
Status: DISCONTINUED | OUTPATIENT
Start: 2023-11-26 | End: 2023-11-26 | Stop reason: HOSPADM

## 2023-11-26 RX ORDER — MAGNESIUM HYDROXIDE/ALUMINUM HYDROXICE/SIMETHICONE 120; 1200; 1200 MG/30ML; MG/30ML; MG/30ML
30 SUSPENSION ORAL EVERY 4 HOURS PRN
Status: DISCONTINUED | OUTPATIENT
Start: 2023-11-26 | End: 2023-11-26 | Stop reason: HOSPADM

## 2023-11-26 RX ADMIN — RIVAROXABAN 15 MG: 15 TABLET, FILM COATED ORAL at 08:36

## 2023-11-26 ASSESSMENT — ACTIVITIES OF DAILY LIVING (ADL)
ADLS_ACUITY_SCORE: 18

## 2023-11-26 NOTE — PLAN OF CARE
Problem: Pulmonary Impairment  Goal: Optimal Gas Exchange  Outcome: Progressing   Goal Outcome Evaluation:       Heme & oncology came & spoke w/ pt. Heparin gtt discontinued, switched to xarelto. First dose given in the evening then heparin stopped per provider's order & MAR. Vitals stable. On RA. Lung sounds clear/diminished. Tele: NSR. No acute changes NOC. Pt able to make needs known. Call light within reach.

## 2023-11-26 NOTE — CONSULTS
Consultation - Hematology/Oncology  Alan Thapa,  1970, MRN [id number]    Admitting Dx: Multiple subsegmental pulmonary emboli without acute cor pulmonale (H) [I26.94]    PCP: System, Provider Not In, None   Code status:  Full Code       Extended Emergency Contact Information  Primary Emergency Contact: Lurdes Thapa  Mobile Phone: [phone]  Relation: Spouse       Assessment and Plan     1.  Pulmonary emboli:  Seemingly unprovoked.  Will start him on rivaroxaban.  No further evaluation is needed while he is in the hospital.  We can see him in clinic in a few months to discuss the pros and cons of long-term anticoagulation.  Questions were answered.       Chief Complaint Multiple subsegmental pulmonary emboli without acute cor pulmonale (H)       HPI      We have been requested by Dr. Allen to evaluate Alan Thapa who is a 53 year old year old male for pulmonary embolism.  The patient presents to the emergency room today with chest pain and shortness of breath.  Imaging revealed right-sided pulmonary emboli.  No lung mass or lymphadenopathy.  He was started on heparin.  This evening he is feeling okay.  Denies chest pain or shortness of breath or cough.  No bleeding.       Medical History  Past Medical History:   Diagnosis Date    Chronic fatigue 2023    Male erectile dysfunction, unspecified 2023    Pizano's neuroma of left foot 2023    Other hyperlipidemia 2023      Surgical History  He  has no past surgical history on file.   Social History  Reviewed, and he  is a non-smoker   Allergies  No Known Allergies Family History  Reviewed, and family history is not on file.  No family history of any blood dyscrasias Psychosocial Needs  Social History     Social History Narrative    Not on file     Additional psychosocial needs reviewed per nursing assessment.     Prior to Admission Medications   Medications Prior to Admission   Medication Sig Dispense Refill Last Dose     "atorvastatin (LIPITOR) 10 MG tablet Take 10 mg by mouth at bedtime   11/24/2023 at HS    multivitamin, therapeutic (THERA-VIT) TABS tablet Take 1 tablet by mouth at bedtime   11/24/2023 at HS    naproxen sodium (ANAPROX) 220 MG tablet Take 2 tablets by mouth daily as needed for moderate pain   11/25/2023 at AM    sildenafil (VIAGRA) 100 MG tablet Take 100 mg by mouth daily as needed (Erectile dysfunction)   More than a month at PRN          Review of Systems:    As above in the history.     Review of Systems otherwise Negative for:  General: chills, fever or night sweats  Psychological: anxiety or depression  Ophthalmic: blurry vision, double vision or loss of vision, vision change  ENT: epistaxis, oral lesions, hearing changes  Hematological and Lymphatic: bleeding, bruising, jaundice, swollen lymph nodes  Endocrine: hot flashes, malaise/lethargy or unexpected weight changes  Respiratory: hemoptysis  Cardiovascular: edema, palpitations or PND  Gastrointestinal: abdominal pain, blood in stools, change in bowel habits, constipation, diarrhea or nausea/vomiting  Genito-Urinary: change in urinary stream, incontinence, frequency/urgency  Musculoskeletal: joint pain, stiffness, swelling, muscle pain or weakness  Neurological: dizziness, headaches, numbness/tingling  Dermatological: lumps and rash    Physical Exam:    Vitals:    11/25/23 1430 11/25/23 1605 11/25/23 1615 11/25/23 1922   BP: 115/76   102/66   Pulse: 68      Resp: 20 20  22   Temp:  99.1  F (37.3  C)  97.8  F (36.6  C)   TempSrc:  Oral  Oral   SpO2: 96% 97%  96%   Weight:   94.5 kg (208 lb 4.8 oz)    Height:   1.854 m (6' 1\")      General: patient appears stated age of 53 year old. Nontoxic and in no distress.   HEENT: Head: atraumatic, normocephalic. Sclerae anicteric.  Chest:  Normal respiratory effort  Cardiac:  No edema.   Abdomen: abdomen is non-distended  Extremities: normal tone and muscle bulk.  Skin: no lesions or rash. Warm and dry.   CNS: alert " and oriented. Grossly non-focal.   Psychiatric: normal mood and affect.        Pertinent Labs:    Lab Results: personally reviewed.   Lab Results   Component Value Date     11/25/2023    CO2 21 11/25/2023    CO2 27 11/04/2019    BUN 27.5 11/25/2023    BUN 17 11/04/2019     Lab Results   Component Value Date    WBC 8.1 11/25/2023    HGB 14.4 11/25/2023    HCT 40.1 11/25/2023    MCV 84 11/25/2023     11/25/2023            Pertinent Radiology  Radiology Results: Personally reviewed image/s and Personally reviewed impression/s    No results found.

## 2023-11-26 NOTE — PROGRESS NOTES
Care Management Discharge Note    Discharge Date: 11/26/2023       Discharge Disposition: Home    Discharge Services: None    Discharge DME: None    Discharge Transportation: family or friend will provide    Private pay costs discussed: Not applicable    Does the patient's insurance plan have a 3 day qualifying hospital stay waiver?  No    PAS Confirmation Code: N/A  Patient/family educated on Medicare website which has current facility and service quality ratings: no    Education Provided on the Discharge Plan:    Persons Notified of Discharge Plans: per team  Patient/Family in Agreement with the Plan: yes    Handoff Referral Completed: Yes    Additional Information:  12:23 PM  Plan is for Pt to discharge home with his wife. Independent at baseline. No CM needs requested or identified.    CM will sign off. Please contact CM if any additional needs arise prior to discharge.     GENEVIEVE Harden      
Critical Anti Xa <1.10 called to floor.  Heparin drip paused at 1907.  Resume at decreased rate in 1 hour.    
non-verbal indicators absent

## 2023-11-26 NOTE — DISCHARGE SUMMARY
[facility]  Hospitalist Discharge Summary      Date of Admission:  11/25/2023  Date of Discharge:  11/26/2023 12:00 PM  Discharging Provider: Arash Stahl,   Discharge Service: Hospitalist Service        Follow-ups Needed After Discharge   Follow-up Appointments     Follow-up and recommended labs and tests       Follow up with primary care provider, Provider Not In System, within 7   days for hospital follow- up.    Follow up with Hematology as directed            Unresulted Labs Ordered in the Past 30 Days of this Admission       No orders found for last 31 day(s).        These results will be followed up by Hospitalist results pool    Discharge Disposition   Discharged to home  Condition at discharge: Stable      Hospital Course   53 year old male presenting with SOB and CP and found to have DVT/PE     Bilateral PE, seems unprovoked  - started on IV heparin, transitioned to Xarelto  - dishcarged on Xarelto with plans for Heme follow up as outpatient  - Heme did not recommend any other inpatient workup  - TTE with mildly reduced EF at 45-50%, flattening of the left  ventricular septum suggesting right ventricular pressure overload. Diastolic  Doppler findings suggest left ventricular filling pressures are normal.  The right ventricle appears moderately dilated. Right ventricular systolic  function is moderately reduced.  - plan outpatient PCP follow up and can consider repeat TTE in a few months     DVT   US showed short segment nonocclusive deep venous thrombosis of a left posterior tibial vein in the calf. No evidence of deep venous thrombosis of the right lower extremity.  - treating as above      HLP  - resume home statin    Consultations This Hospital Stay   PHARMACY IP CONSULT  CARE MANAGEMENT / SOCIAL WORK IP CONSULT  HEMATOLOGY & ONCOLOGY IP CONSULT  PHARMACY TEST CLAIM IP CONSULT  PHARMACY TEST CLAIM IP CONSULT    Code Status   Full Code    Time Spent on this Encounter    I, Arash Stahl DO, personally saw the patient today and spent greater than 30 minutes discharging this patient.       Arash Stahl DO  [facility]  [address] [place] [id number]  Phone: [phone]  Fax: [phone]  ______________________________________________________________________    Physical Exam   Vital Signs: Temp: 97.8  F (36.6  C) Temp src: Oral BP: 110/66 Pulse: 71   Resp: 18 SpO2: 93 % O2 Device: None (Room air)    Weight: 209 lbs 0 oz    General: NAD  HEENT: Without congestion or inflammation  RESPIRATORY: Respirations nonlabored  CARDIOVASCULAR: trace left le edema   NEUROLOGIC: No focal arm or leg weakness, speech is clear    Primary Care Physician   Provider Not In System    Discharge Orders      Reason for your hospital stay    DVT/PE     Follow-up and recommended labs and tests     Follow up with primary care provider, Provider Not In System, within 7 days for hospital follow- up.    Follow up with Hematology as directed     Activity    Your activity upon discharge: no contact sports     Diet    Follow this diet upon discharge: Orders Placed This Encounter      Regular Diet Adult     \    Discharge Medications   Discharge Medication List as of 11/26/2023 11:40 AM        START taking these medications    Details   Rivaroxaban ANTICOAGULANT 15 & 20 MG TBPK Starter Therapy Pack Take 15 mg by mouth 2 times daily (with meals) for 21 days, THEN 20 mg daily with food for 9 days., Disp-51 each, R-0, E-Prescribe           CONTINUE these medications which have NOT CHANGED    Details   atorvastatin (LIPITOR) 10 MG tablet Take 10 mg by mouth at bedtime, Historical      multivitamin, therapeutic (THERA-VIT) TABS tablet Take 1 tablet by mouth at bedtime, Historical      sildenafil (VIAGRA) 100 MG tablet Take 100 mg by mouth daily as needed (Erectile dysfunction), Historical           STOP taking these medications       naproxen sodium (ANAPROX)  220 MG tablet Comments:   Reason for Stopping:             Allergies   No Known Allergies

## 2023-12-05 LAB
ATRIAL RATE - MUSE: 71 BPM
ATRIAL RATE - MUSE: 85 BPM
DIASTOLIC BLOOD PRESSURE - MUSE: NORMAL MMHG
DIASTOLIC BLOOD PRESSURE - MUSE: NORMAL MMHG
INTERPRETATION ECG - MUSE: NORMAL
INTERPRETATION ECG - MUSE: NORMAL
P AXIS - MUSE: 57 DEGREES
P AXIS - MUSE: 59 DEGREES
PR INTERVAL - MUSE: 170 MS
PR INTERVAL - MUSE: 172 MS
QRS DURATION - MUSE: 104 MS
QRS DURATION - MUSE: 98 MS
QT - MUSE: 370 MS
QT - MUSE: 408 MS
QTC - MUSE: 440 MS
QTC - MUSE: 443 MS
R AXIS - MUSE: -22 DEGREES
R AXIS - MUSE: -25 DEGREES
SYSTOLIC BLOOD PRESSURE - MUSE: NORMAL MMHG
SYSTOLIC BLOOD PRESSURE - MUSE: NORMAL MMHG
T AXIS - MUSE: -5 DEGREES
T AXIS - MUSE: 3 DEGREES
VENTRICULAR RATE- MUSE: 71 BPM
VENTRICULAR RATE- MUSE: 85 BPM

## 2023-12-12 ENCOUNTER — TRANSCRIBE ORDERS (OUTPATIENT)
Dept: OTHER | Age: 53
End: 2023-12-12

## 2023-12-12 ENCOUNTER — LAB REQUISITION (OUTPATIENT)
Dept: LAB | Facility: CLINIC | Age: 53
End: 2023-12-12
Payer: COMMERCIAL

## 2023-12-12 DIAGNOSIS — Z12.5 ENCOUNTER FOR SCREENING FOR MALIGNANT NEOPLASM OF PROSTATE: ICD-10-CM

## 2023-12-12 DIAGNOSIS — Z13.6 ENCOUNTER FOR SCREENING FOR CARDIOVASCULAR DISORDERS: ICD-10-CM

## 2023-12-12 DIAGNOSIS — I82.409 DVT (DEEP VENOUS THROMBOSIS) (H): Primary | ICD-10-CM

## 2023-12-12 PROCEDURE — G0103 PSA SCREENING: HCPCS | Mod: ORL | Performed by: PHYSICIAN ASSISTANT

## 2023-12-12 PROCEDURE — 80053 COMPREHEN METABOLIC PANEL: CPT | Mod: ORL | Performed by: PHYSICIAN ASSISTANT

## 2023-12-12 PROCEDURE — 80061 LIPID PANEL: CPT | Mod: ORL | Performed by: PHYSICIAN ASSISTANT

## 2023-12-13 LAB
ALBUMIN SERPL BCG-MCNC: 4.3 G/DL (ref 3.5–5.2)
ALP SERPL-CCNC: 82 U/L (ref 40–150)
ALT SERPL W P-5'-P-CCNC: 43 U/L (ref 0–70)
ANION GAP SERPL CALCULATED.3IONS-SCNC: 14 MMOL/L (ref 7–15)
AST SERPL W P-5'-P-CCNC: 24 U/L (ref 0–45)
BILIRUB SERPL-MCNC: 0.3 MG/DL
BUN SERPL-MCNC: 21.2 MG/DL (ref 6–20)
CALCIUM SERPL-MCNC: 9.3 MG/DL (ref 8.6–10)
CHLORIDE SERPL-SCNC: 103 MMOL/L (ref 98–107)
CHOLEST SERPL-MCNC: 156 MG/DL
CREAT SERPL-MCNC: 1.16 MG/DL (ref 0.67–1.17)
DEPRECATED HCO3 PLAS-SCNC: 23 MMOL/L (ref 22–29)
EGFRCR SERPLBLD CKD-EPI 2021: 75 ML/MIN/1.73M2
FASTING STATUS PATIENT QL REPORTED: ABNORMAL
GLUCOSE SERPL-MCNC: 104 MG/DL (ref 70–99)
HDLC SERPL-MCNC: 37 MG/DL
LDLC SERPL CALC-MCNC: 73 MG/DL
NONHDLC SERPL-MCNC: 119 MG/DL
POTASSIUM SERPL-SCNC: 3.8 MMOL/L (ref 3.4–5.3)
PROT SERPL-MCNC: 6.6 G/DL (ref 6.4–8.3)
PSA SERPL DL<=0.01 NG/ML-MCNC: 1.21 NG/ML (ref 0–3.5)
SODIUM SERPL-SCNC: 140 MMOL/L (ref 135–145)
TRIGL SERPL-MCNC: 229 MG/DL

## 2023-12-20 NOTE — TELEPHONE ENCOUNTER
RECORDS STATUS - ALL OTHER DIAGNOSIS      RECORDS RECEIVED FROM: Epic   DATE RECEIVED:    NOTES STATUS DETAILS   OFFICE NOTE from referring provider External Self-Referred   DISCHARGE SUMMARY from hospital [facility] 11/25/23: [facility]   MEDICATION LIST [facility]    LABS     ANYTHING RELATED TO DIAGNOSIS Epic Most recent 12/12/23   IMAGING (NEED IMAGES & REPORT)     CT SCANS PACS 11/25/23: CT Chest Pulm   ULTRASOUND PACS 11/25/23: US Lower Extremity

## 2023-12-30 ENCOUNTER — HEALTH MAINTENANCE LETTER (OUTPATIENT)
Age: 53
End: 2023-12-30

## 2024-01-09 ENCOUNTER — ONCOLOGY VISIT (OUTPATIENT)
Dept: ONCOLOGY | Facility: HOSPITAL | Age: 54
End: 2024-01-09
Attending: INTERNAL MEDICINE
Payer: COMMERCIAL

## 2024-01-09 ENCOUNTER — PRE VISIT (OUTPATIENT)
Dept: ONCOLOGY | Facility: HOSPITAL | Age: 54
End: 2024-01-09
Payer: COMMERCIAL

## 2024-01-09 VITALS
BODY MASS INDEX: 27.43 KG/M2 | TEMPERATURE: 96.7 F | HEIGHT: 73 IN | WEIGHT: 207 LBS | SYSTOLIC BLOOD PRESSURE: 107 MMHG | RESPIRATION RATE: 18 BRPM | HEART RATE: 66 BPM | DIASTOLIC BLOOD PRESSURE: 63 MMHG | OXYGEN SATURATION: 98 %

## 2024-01-09 DIAGNOSIS — I82.442 ACUTE DEEP VEIN THROMBOSIS (DVT) OF TIBIAL VEIN OF LEFT LOWER EXTREMITY (H): ICD-10-CM

## 2024-01-09 DIAGNOSIS — I26.94 MULTIPLE SUBSEGMENTAL PULMONARY EMBOLI WITHOUT ACUTE COR PULMONALE (H): Primary | ICD-10-CM

## 2024-01-09 PROCEDURE — 99214 OFFICE O/P EST MOD 30 MIN: CPT | Performed by: INTERNAL MEDICINE

## 2024-01-09 PROCEDURE — 99213 OFFICE O/P EST LOW 20 MIN: CPT | Performed by: INTERNAL MEDICINE

## 2024-01-09 RX ORDER — LOPERAMIDE HYDROCHLORIDE 2 MG/1
TABLET ORAL
COMMUNITY
Start: 2023-11-16 | End: 2024-01-09

## 2024-01-09 RX ORDER — ACETAMINOPHEN 500 MG
500-1000 TABLET ORAL PRN
COMMUNITY

## 2024-01-09 ASSESSMENT — PAIN SCALES - GENERAL: PAINLEVEL: NO PAIN (0)

## 2024-01-09 NOTE — PROGRESS NOTES
[facility] Hematology and Oncology Progress Note    Patient: Alan Thapa  MRN: [id number]  Date of Service: Jan 9, 2024        Assessment and Plan:    1.  Pulmonary embolism: The patient has been on treatment now for about 2 months.  He is tolerating his rivaroxaban well.  He no longer has any symptoms.  We spent some time discussing the long-term plan.  I reviewed with him that his risk of recurrence should he stop anticoagulation after 6 months about 30%.  That breaks down to a 15% chance of PE and 15% chance of DVT.  I also reviewed with him that about 2% of pulmonary emboli are fatal.  If you are to continue on anticoagulation then the risk of recurrent clotting is about 3%.  After some discussion he decided he would like to stop his anticoagulation after 6 months which would be an May.  I think this is a very reasonable decision.  We then talked about the cause of his DVT and PE which I told him it is difficult to determine.  We typically do not check hypercoagulable panels as they do not affect our treatment decision making.  All of his questions were answered.  Encouraged him to reach out to us in the future if he has any further questions.  Follow-up as needed.    ECOG Performance  0    Diagnosis:    1.  Short segment posterior tibial DVT and left calf, November 2023.  2.  Bilateral lobar, interlobar, and segmental pulmonary embolism with moderate clot burden November 2023.    Treatment:    He is on rivaroxaban.    Interim History:    Alan returns for follow-up visit to discuss his diagnosis of DVT.  I saw him while he was hospitalized November 25, 2023.  He was admitted with his emergency room after presenting with some chest pain and shortness of breath.  Imaging revealed right-sided pulmonary emboli with no mass or lymphadenopathy in the chest.  He was started on heparin and discharged on rivaroxaban.  Overall he has been doing okay.  Denies leg swelling, shortness of breath or chest  "discomfort.    Review of Systems:    As above in the history.     Review of Systems otherwise Negative for:  General: chills, fever or night sweats  Psychological: anxiety or depression  Ophthalmic: blurry vision, double vision or loss of vision, vision change  ENT: epistaxis, oral lesions, hearing changes  Hematological and Lymphatic: bleeding, bruising, jaundice, swollen lymph nodes  Endocrine: hot flashes, unexpected weight changes  Respiratory: cough, hemoptysis, orthopnea or shortness of breath/YAÑEZ  Cardiovascular: chest pain, edema, palpitations or PND  Gastrointestinal: abdominal pain, blood in stools, change in bowel habits, constipation, diarrhea or nausea/vomiting  Genito-Urinary: change in urinary stream, incontinence, frequency/urgency  Musculoskeletal: joint pain, stiffness, swelling, muscle pain  Neurological: dizziness, headaches, numbness/tingling  Dermatological: lumps and rash    Past History:    Past Medical History:   Diagnosis Date    Chronic fatigue 11/25/2023    Male erectile dysfunction, unspecified 11/25/2023    Pizano's neuroma of left foot 11/25/2023    Other hyperlipidemia 11/25/2023     Physical Exam:    /63 (BP Location: Left arm, Patient Position: Sitting, Cuff Size: Adult Regular)   Pulse 66   Temp (!) 96.7  F (35.9  C) (Tympanic)   Resp 18   Ht 1.854 m (6' 1\")   Wt 93.9 kg (207 lb)   SpO2 98%   BMI 27.31 kg/m      General: patient appears stated age of 53 year old. Nontoxic and in no distress.   HEENT: Head: atraumatic, normocephalic. Sclerae anicteric.  Chest:  Normal respiratory effort  Cardiac:  No edema.   Abdomen: abdomen is non-distended  Extremities: normal tone and muscle bulk.  Skin: no lesions or rash on visible skin. Warm and dry.   CNS: alert and oriented. Grossly non-focal.   Psychiatric: normal mood and affect.     Lab Results:    No results found for this or any previous visit (from the past 168 hour(s)).      Signed by: Jeffrey Barahona MD    "

## 2024-01-09 NOTE — LETTER
1/9/2024         RE: Alan Thapa  [address] [facility] [address]        Dear Colleague,    Thank you for referring your patient, Alan Thapa, to the [facility]. Please see a copy of my visit note below.    [facility] Hematology and Oncology Progress Note    Patient: Alan Thapa  MRN: [id number]  Date of Service: Jan 9, 2024        Assessment and Plan:    1.  Pulmonary embolism: The patient has been on treatment now for about 2 months.  He is tolerating his rivaroxaban well.  He no longer has any symptoms.  We spent some time discussing the long-term plan.  I reviewed with him that his risk of recurrence should he stop anticoagulation after 6 months about 30%.  That breaks down to a 15% chance of PE and 15% chance of DVT.  I also reviewed with him that about 2% of pulmonary emboli are fatal.  If you are to continue on anticoagulation then the risk of recurrent clotting is about 3%.  After some discussion he decided he would like to stop his anticoagulation after 6 months which would be an May.  I think this is a very reasonable decision.  We then talked about the cause of his DVT and PE which I told him it is difficult to determine.  We typically do not check hypercoagulable panels as they do not affect our treatment decision making.  All of his questions were answered.  Encouraged him to reach out to us in the future if he has any further questions.  Follow-up as needed.    ECOG Performance  0    Diagnosis:    1.  Short segment posterior tibial DVT and left calf, November 2023.  2.  Bilateral lobar, interlobar, and segmental pulmonary embolism with moderate clot burden November 2023.    Treatment:    He is on rivaroxaban.    Interim History:    Alan returns for follow-up visit to discuss his diagnosis of DVT.  I saw him while he was hospitalized November 25, 2023.  He was admitted with his emergency room after presenting with some chest pain and shortness of  "breath.  Imaging revealed right-sided pulmonary emboli with no mass or lymphadenopathy in the chest.  He was started on heparin and discharged on rivaroxaban.  Overall he has been doing okay.  Denies leg swelling, shortness of breath or chest discomfort.    Review of Systems:    As above in the history.     Review of Systems otherwise Negative for:  General: chills, fever or night sweats  Psychological: anxiety or depression  Ophthalmic: blurry vision, double vision or loss of vision, vision change  ENT: epistaxis, oral lesions, hearing changes  Hematological and Lymphatic: bleeding, bruising, jaundice, swollen lymph nodes  Endocrine: hot flashes, unexpected weight changes  Respiratory: cough, hemoptysis, orthopnea or shortness of breath/YAÑEZ  Cardiovascular: chest pain, edema, palpitations or PND  Gastrointestinal: abdominal pain, blood in stools, change in bowel habits, constipation, diarrhea or nausea/vomiting  Genito-Urinary: change in urinary stream, incontinence, frequency/urgency  Musculoskeletal: joint pain, stiffness, swelling, muscle pain  Neurological: dizziness, headaches, numbness/tingling  Dermatological: lumps and rash    Past History:    Past Medical History:   Diagnosis Date     Chronic fatigue 11/25/2023     Male erectile dysfunction, unspecified 11/25/2023     Pizano's neuroma of left foot 11/25/2023     Other hyperlipidemia 11/25/2023     Physical Exam:    /63 (BP Location: Left arm, Patient Position: Sitting, Cuff Size: Adult Regular)   Pulse 66   Temp (!) 96.7  F (35.9  C) (Tympanic)   Resp 18   Ht 1.854 m (6' 1\")   Wt 93.9 kg (207 lb)   SpO2 98%   BMI 27.31 kg/m      General: patient appears stated age of 53 year old. Nontoxic and in no distress.   HEENT: Head: atraumatic, normocephalic. Sclerae anicteric.  Chest:  Normal respiratory effort  Cardiac:  No edema.   Abdomen: abdomen is non-distended  Extremities: normal tone and muscle bulk.  Skin: no lesions or rash on visible skin. " "Warm and dry.   CNS: alert and oriented. Grossly non-focal.   Psychiatric: normal mood and affect.     Lab Results:    No results found for this or any previous visit (from the past 168 hour(s)).      Signed by: Jeffrey Barahona MD      Oncology Rooming Note    January 9, 2024 10:57 AM   Alan Thapa is a 53 year old male who presents for:    Chief Complaint   Patient presents with     Hematology     New patient consult related to DVT (deep venous thrombosis)     Initial Vitals: /63 (BP Location: Left arm, Patient Position: Sitting, Cuff Size: Adult Regular)   Pulse 66   Temp (!) 96.7  F (35.9  C) (Tympanic)   Resp 18   Ht 1.854 m (6' 1\")   Wt 93.9 kg (207 lb)   SpO2 98%   BMI 27.31 kg/m   Estimated body mass index is 27.31 kg/m  as calculated from the following:    Height as of this encounter: 1.854 m (6' 1\").    Weight as of this encounter: 93.9 kg (207 lb). Body surface area is 2.2 meters squared.  No Pain (0) Comment: Data Unavailable   No LMP for male patient.  Allergies reviewed: Yes  Medications reviewed: Yes    Medications: Medication refills not needed today.  Pharmacy name entered into [employer]: [id number] - [place] [id number]    Frailty Screening:   Is the patient here for a new oncology consult visit in cancer care? 2. No      Clinical concerns: New patient consult related to DVT (deep venous thrombosis)      CL SIMS CMA                Again, thank you for allowing me to participate in the care of your patient.        Sincerely,        Jeffrey Barahona MD  "

## 2024-01-09 NOTE — PROGRESS NOTES
"Oncology Rooming Note    January 9, 2024 10:57 AM   Alan Thapa is a 53 year old male who presents for:    Chief Complaint   Patient presents with    Hematology     New patient consult related to DVT (deep venous thrombosis)     Initial Vitals: /63 (BP Location: Left arm, Patient Position: Sitting, Cuff Size: Adult Regular)   Pulse 66   Temp (!) 96.7  F (35.9  C) (Tympanic)   Resp 18   Ht 1.854 m (6' 1\")   Wt 93.9 kg (207 lb)   SpO2 98%   BMI 27.31 kg/m   Estimated body mass index is 27.31 kg/m  as calculated from the following:    Height as of this encounter: 1.854 m (6' 1\").    Weight as of this encounter: 93.9 kg (207 lb). Body surface area is 2.2 meters squared.  No Pain (0) Comment: Data Unavailable   No LMP for male patient.  Allergies reviewed: Yes  Medications reviewed: Yes    Medications: Medication refills not needed today.  Pharmacy name entered into [employer]: [id number] - [place] [phone]    Frailty Screening:   Is the patient here for a new oncology consult visit in cancer care? 2. No      Clinical concerns: New patient consult related to DVT (deep venous thrombosis)      CL SIMS CMA              "

## 2024-10-15 ENCOUNTER — HOSPITAL ENCOUNTER (EMERGENCY)
Facility: HOSPITAL | Age: 54
Discharge: HOME | End: 2024-10-15
Attending: STUDENT IN AN ORGANIZED HEALTH CARE EDUCATION/TRAINING PROGRAM | Admitting: STUDENT IN AN ORGANIZED HEALTH CARE EDUCATION/TRAINING PROGRAM
Payer: COMMERCIAL

## 2024-10-15 ENCOUNTER — APPOINTMENT (OUTPATIENT)
Dept: CT IMAGING | Facility: HOSPITAL | Age: 54
End: 2024-10-15
Attending: EMERGENCY MEDICINE
Payer: COMMERCIAL

## 2024-10-15 VITALS
TEMPERATURE: 97.8 F | HEART RATE: 62 BPM | HEIGHT: 73 IN | DIASTOLIC BLOOD PRESSURE: 70 MMHG | RESPIRATION RATE: 13 BRPM | OXYGEN SATURATION: 96 % | WEIGHT: 213 LBS | BODY MASS INDEX: 28.23 KG/M2 | SYSTOLIC BLOOD PRESSURE: 106 MMHG

## 2024-10-15 DIAGNOSIS — I26.99 BILATERAL PULMONARY EMBOLISM (H): ICD-10-CM

## 2024-10-15 LAB
ANION GAP SERPL CALCULATED.3IONS-SCNC: 12 MMOL/L (ref 7–15)
BASOPHILS # BLD AUTO: 0 10E3/UL (ref 0–0.2)
BASOPHILS NFR BLD AUTO: 1 %
BUN SERPL-MCNC: 17.1 MG/DL (ref 6–20)
CALCIUM SERPL-MCNC: 9.2 MG/DL (ref 8.8–10.4)
CHLORIDE SERPL-SCNC: 104 MMOL/L (ref 98–107)
CREAT SERPL-MCNC: 1.08 MG/DL (ref 0.67–1.17)
EGFRCR SERPLBLD CKD-EPI 2021: 82 ML/MIN/1.73M2
EOSINOPHIL # BLD AUTO: 0.1 10E3/UL (ref 0–0.7)
EOSINOPHIL NFR BLD AUTO: 2 %
ERYTHROCYTE [DISTWIDTH] IN BLOOD BY AUTOMATED COUNT: 11.6 % (ref 10–15)
GLUCOSE SERPL-MCNC: 151 MG/DL (ref 70–99)
HCO3 SERPL-SCNC: 24 MMOL/L (ref 22–29)
HCT VFR BLD AUTO: 42.9 % (ref 40–53)
HGB BLD-MCNC: 15 G/DL (ref 13.3–17.7)
HOLD SPECIMEN: NORMAL
HOLD SPECIMEN: NORMAL
IMM GRANULOCYTES # BLD: 0 10E3/UL
IMM GRANULOCYTES NFR BLD: 0 %
LYMPHOCYTES # BLD AUTO: 1.7 10E3/UL (ref 0.8–5.3)
LYMPHOCYTES NFR BLD AUTO: 27 %
MCH RBC QN AUTO: 29.4 PG (ref 26.5–33)
MCHC RBC AUTO-ENTMCNC: 35 G/DL (ref 31.5–36.5)
MCV RBC AUTO: 84 FL (ref 78–100)
MONOCYTES # BLD AUTO: 0.4 10E3/UL (ref 0–1.3)
MONOCYTES NFR BLD AUTO: 6 %
NEUTROPHILS # BLD AUTO: 4.2 10E3/UL (ref 1.6–8.3)
NEUTROPHILS NFR BLD AUTO: 65 %
NRBC # BLD AUTO: 0 10E3/UL
NRBC BLD AUTO-RTO: 0 /100
NT-PROBNP SERPL-MCNC: <36 PG/ML (ref 0–900)
PLATELET # BLD AUTO: 262 10E3/UL (ref 150–450)
POTASSIUM SERPL-SCNC: 4.1 MMOL/L (ref 3.4–5.3)
RBC # BLD AUTO: 5.1 10E6/UL (ref 4.4–5.9)
SODIUM SERPL-SCNC: 140 MMOL/L (ref 135–145)
TROPONIN T SERPL HS-MCNC: 7 NG/L
TROPONIN T SERPL HS-MCNC: 7 NG/L
WBC # BLD AUTO: 6.4 10E3/UL (ref 4–11)

## 2024-10-15 PROCEDURE — 80048 BASIC METABOLIC PNL TOTAL CA: CPT | Performed by: STUDENT IN AN ORGANIZED HEALTH CARE EDUCATION/TRAINING PROGRAM

## 2024-10-15 PROCEDURE — 250N000011 HC RX IP 250 OP 636: Performed by: EMERGENCY MEDICINE

## 2024-10-15 PROCEDURE — 82310 ASSAY OF CALCIUM: CPT | Performed by: STUDENT IN AN ORGANIZED HEALTH CARE EDUCATION/TRAINING PROGRAM

## 2024-10-15 PROCEDURE — 71275 CT ANGIOGRAPHY CHEST: CPT

## 2024-10-15 PROCEDURE — 250N000013 HC RX MED GY IP 250 OP 250 PS 637: Performed by: EMERGENCY MEDICINE

## 2024-10-15 PROCEDURE — 93005 ELECTROCARDIOGRAM TRACING: CPT | Performed by: STUDENT IN AN ORGANIZED HEALTH CARE EDUCATION/TRAINING PROGRAM

## 2024-10-15 PROCEDURE — 83880 ASSAY OF NATRIURETIC PEPTIDE: CPT | Performed by: EMERGENCY MEDICINE

## 2024-10-15 PROCEDURE — 36415 COLL VENOUS BLD VENIPUNCTURE: CPT | Performed by: STUDENT IN AN ORGANIZED HEALTH CARE EDUCATION/TRAINING PROGRAM

## 2024-10-15 PROCEDURE — 99285 EMERGENCY DEPT VISIT HI MDM: CPT | Mod: 25

## 2024-10-15 PROCEDURE — 84484 ASSAY OF TROPONIN QUANT: CPT | Performed by: STUDENT IN AN ORGANIZED HEALTH CARE EDUCATION/TRAINING PROGRAM

## 2024-10-15 PROCEDURE — 85014 HEMATOCRIT: CPT | Performed by: STUDENT IN AN ORGANIZED HEALTH CARE EDUCATION/TRAINING PROGRAM

## 2024-10-15 PROCEDURE — 85004 AUTOMATED DIFF WBC COUNT: CPT | Performed by: STUDENT IN AN ORGANIZED HEALTH CARE EDUCATION/TRAINING PROGRAM

## 2024-10-15 PROCEDURE — 85025 COMPLETE CBC W/AUTO DIFF WBC: CPT | Performed by: STUDENT IN AN ORGANIZED HEALTH CARE EDUCATION/TRAINING PROGRAM

## 2024-10-15 RX ORDER — IOPAMIDOL 755 MG/ML
90 INJECTION, SOLUTION INTRAVASCULAR ONCE
Status: COMPLETED | OUTPATIENT
Start: 2024-10-15 | End: 2024-10-15

## 2024-10-15 RX ADMIN — RIVAROXABAN 15 MG: 15 TABLET, FILM COATED ORAL at 19:58

## 2024-10-15 RX ADMIN — IOPAMIDOL 90 ML: 755 INJECTION, SOLUTION INTRAVENOUS at 18:49

## 2024-10-15 ASSESSMENT — COLUMBIA-SUICIDE SEVERITY RATING SCALE - C-SSRS
1. IN THE PAST MONTH, HAVE YOU WISHED YOU WERE DEAD OR WISHED YOU COULD GO TO SLEEP AND NOT WAKE UP?: NO
2. HAVE YOU ACTUALLY HAD ANY THOUGHTS OF KILLING YOURSELF IN THE PAST MONTH?: NO
6. HAVE YOU EVER DONE ANYTHING, STARTED TO DO ANYTHING, OR PREPARED TO DO ANYTHING TO END YOUR LIFE?: NO

## 2024-10-15 ASSESSMENT — ENCOUNTER SYMPTOMS
SHORTNESS OF BREATH: 1
NAUSEA: 0
COUGH: 0
CHILLS: 0
FEVER: 0
ABDOMINAL PAIN: 0
PALPITATIONS: 0
VOMITING: 0

## 2024-10-15 ASSESSMENT — ACTIVITIES OF DAILY LIVING (ADL): ADLS_ACUITY_SCORE: 35

## 2024-10-15 NOTE — ED PROVIDER NOTES
EMERGENCY DEPARTMENT ENCOUNTER      NAME: Alan Thapa  AGE: 54 year old male  YOB: 1970  MRN: [id number]  EVALUATION DATE & TIME: No admission date for patient encounter.    PCP: Fidencio Hoyos    ED PROVIDER: Patricia Diaz PA-C      Chief Complaint   Patient presents with    Shortness of Breath         FINAL IMPRESSION:  1. Bilateral pulmonary embolism (H)          ED COURSE & MEDICAL DECISION MAKING:    Pertinent Labs & Imaging studies reviewed. (See chart for details)    54 year old male presents to the Emergency Department for evaluation of shortness of breath.    Physical exam is remarkable for a generally well-appearing male who is in no acute distress.  Heart and lung sounds are clear diffusely throughout.  No respiratory distress.  No calf tenderness to palpation or swelling.  Vital signs are stable and he is afebrile.    CBC is unremarkable with no leukocytosis or anemia.  BMP is unremarkable with no significant electrolyte derangements, normal kidney function. Troponin initially in male reference range at 7, two hour delta troponin is stable at 7. EKG without acute ischemic changes. BNP within normal limits. CTA of the chest was obtained which is remarkable for mild bilateral PE, significantly decreased burden compared to his similar scan last year but does appear to be new (not residual).    I do not think any further emergent labs or imaging are indicated at this time.  The patient is overall well-appearing here and hemodynamically stable.  Discussed disposition with him including discharge home versus admission to the hospital.  Patient would prefer to be discharged home, I think that is reasonable as he is not hypoxic here and not having any chest pain.  There is no evidence of right heart strain on his imaging, lab work, or EKG.  Patient was prescribed Xarelto previously which he was satisfied with.  He was given his first oral dose here and a starter pack was sent to his  pharmacy to resume tomorrow.  Advised him to follow-up with his primary care provider soon as possible for recheck and to return here for any new or worsening symptoms.  The patient is agreeable with this treatment plan and verbalized understanding.  Care discussed with staff physician Dr. Carlitos Evans who is in agreement with the treatment plan.    Medical Decision Making  Obtained supplemental history:Supplemental history obtained?: No  Reviewed external records: External records reviewed?: Outpatient Record: Oncology visit 01/09/24   Care impacted by chronic illness:Documented in Chart  Care significantly affected by social determinants of health:Access to Medical Care  Did you consider but not order tests?: Work up considered but not performed and documented in chart, if applicable  Did you interpret images independently?: Independent interpretation of ECG and images noted in documentation, when applicable.  Consultation discussion with other provider:Did you involve another provider (consultant, , pharmacy, etc.)?: I discussed the care with another health care provider, see documentation for details.  Discharge. I prescribed additional prescription strength medication(s) as charted. I considered admission, but discharged the patient after share decision making conversation.  CT Pulmonary Angiogram:Patient is moderate to high risk for PE.      ED Course   3:18 PM Performed my initial history and physical exam. Discussed workup in the emergency department, management of symptoms, and likely disposition.   7:32 PM Discussed with radiology, patient does have mild acute bilateral Pes.  7:35 PM Discussed with Dr. Evans, updated patient.     At the conclusion of the encounter I discussed the results of all of the tests and the disposition. The questions were answered. The patient or family acknowledged understanding and was agreeable with the care plan.     Voice recognition software was used in the creation of this  "note. Any grammatical or nonsensical errors are due to inherent errors with the software and are not the intention of the writer.     MEDICATIONS GIVEN IN THE EMERGENCY:  Medications   iopamidol (ISOVUE-370) solution 90 mL (90 mLs Intravenous $Given 10/15/24 1849)   rivaroxaban ANTICOAGULANT (XARELTO) tablet 15 mg (15 mg Oral $Given 10/15/24 1958)       NEW PRESCRIPTIONS STARTED AT TODAY'S ER VISIT  New Prescriptions    RIVAROXABAN ANTICOAGULANT 15 & 20 MG TBPK STARTER THERAPY PACK    Take 15 mg by mouth 2 times daily (with meals) for 21 days, THEN 20 mg daily with food for 9 days.            =================================================================    HPI    Patient information was obtained from: Patient    Use of : N/A        Alan Thapa is a 54 year old male with PMH of PE and DVT who presents to the ED via walk-in for evaluation of shortness of breath.     The patient reports that for about the last week, he has had shortness of breath with exertion.  He states it is \"very mild.\"  He endorses some mild pain on the left side of his chest but believes it is due to tennis he is concerned as he has a history of PE  and he notes that his symptoms were similar with his PE but more severe.  He is not currently anticoagulated.    He denies any recent travel or surgeries.  He does not smoke.  He denies history of cardiac history again.    He denies abdominal pain, nausea, vomiting, fevers, chills, cough, runny or stuffy nose.      REVIEW OF SYSTEMS   Review of Systems   Constitutional:  Negative for chills and fever.   Respiratory:  Positive for shortness of breath. Negative for cough.    Cardiovascular:  Positive for chest pain (Left sided). Negative for palpitations and leg swelling.   Gastrointestinal:  Negative for abdominal pain, nausea and vomiting.       All other systems reviewed and are negative unless noted in HPI.      PAST MEDICAL HISTORY:  Past Medical History:   Diagnosis Date    " "Chronic fatigue 11/25/2023    Male erectile dysfunction, unspecified 11/25/2023    Pizano's neuroma of left foot 11/25/2023    Other hyperlipidemia 11/25/2023       PAST SURGICAL HISTORY:  No past surgical history on file.    CURRENT MEDICATIONS:    Rivaroxaban ANTICOAGULANT 15 & 20 MG TBPK Starter Therapy Pack  acetaminophen (TYLENOL) 500 MG tablet  atorvastatin (LIPITOR) 10 MG tablet  rivaroxaban ANTICOAGULANT (XARELTO) 20 MG TABS tablet  sildenafil (VIAGRA) 100 MG tablet        ALLERGIES:  No Known Allergies    FAMILY HISTORY:  No family history on file.    SOCIAL HISTORY:   Social History     Socioeconomic History    Marital status:    Tobacco Use    Smoking status: Never     Passive exposure: Never    Smokeless tobacco: Never   Vaping Use    Vaping status: Never Used   Substance and Sexual Activity    Alcohol use: Not Currently    Drug use: Not Currently       VITALS:  Patient Vitals for the past 24 hrs:   BP Temp Temp src Pulse Resp SpO2 Height Weight   10/15/24 2002 122/77 -- -- 67 16 97 % -- --   10/15/24 1429 123/71 97.8  F (36.6  C) Oral 77 16 94 % 1.854 m (6' 1\") 96.6 kg (213 lb)       PHYSICAL EXAM    VITAL SIGNS: /77   Pulse 67   Temp 97.8  F (36.6  C) (Oral)   Resp 16   Ht 1.854 m (6' 1\")   Wt 96.6 kg (213 lb)   SpO2 97%   BMI 28.10 kg/m    General Appearance: Alert, cooperative, normal speech and facial symmetry, appears stated age, the patient does not appear in distress  Head:  Normocephalic, without obvious abnormality, atraumatic  Eyes: Conjunctiva/corneas clear, EOM's intact, no nystagmus, PERRL  ENT:  Lips, mucosa, and tongue normal; teeth and gums normal, no pharyngeal inflammation, no dysphonia or difficulty swallowing, membranes are moist without pallor  Cardio:  Regular rate and rhythm, S1 and S2 normal, no murmur, rub    or gallop, 2+ pulses symmetric in all extremities  Pulm:  Clear to auscultation bilaterally, respirations unlabored with no accessory muscle " use  Extremities:  Extremities normal, there is no tenderness to palpation, atraumatic, no cyanosis or edema, full function and range of motion, pulses equal in all extremities, normal cap refill, no joint swelling; no calf tenderness or swelling  Skin: Skin is warm and dry, no rashes or wounds  Neuro: Patient is awake, alert, and responsive to voice. No gross motor weaknesses or sensory loss; moves all extremities.    LAB:  All pertinent labs reviewed and interpreted.  Labs Ordered and Resulted from Time of ED Arrival to Time of ED Departure   BASIC METABOLIC PANEL - Abnormal       Result Value    Sodium 140      Potassium 4.1      Chloride 104      Carbon Dioxide (CO2) 24      Anion Gap 12      Urea Nitrogen 17.1      Creatinine 1.08      GFR Estimate 82      Calcium 9.2      Glucose 151 (*)    TROPONIN T, HIGH SENSITIVITY - Normal    Troponin T, High Sensitivity 7     TROPONIN T, HIGH SENSITIVITY - Normal    Troponin T, High Sensitivity 7     N TERMINAL PRO BNP OUTPATIENT - Normal    N Terminal Pro BNP Outpatient <36     CBC WITH PLATELETS AND DIFFERENTIAL    WBC Count 6.4      RBC Count 5.10      Hemoglobin 15.0      Hematocrit 42.9      MCV 84      MCH 29.4      MCHC 35.0      RDW 11.6      Platelet Count 262      % Neutrophils 65      % Lymphocytes 27      % Monocytes 6      % Eosinophils 2      % Basophils 1      % Immature Granulocytes 0      NRBCs per 100 WBC 0      Absolute Neutrophils 4.2      Absolute Lymphocytes 1.7      Absolute Monocytes 0.4      Absolute Eosinophils 0.1      Absolute Basophils 0.0      Absolute Immature Granulocytes 0.0      Absolute NRBCs 0.0         RADIOLOGY:  Reviewed all pertinent imaging. Please see official radiology report.  CT Chest Pulmonary Embolism w Contrast   Final Result   IMPRESSION:   1.  Small bilateral pulmonary emboli. These are likely acute, although residual emboli from previous large embolic burden possible.   2.  Results discussed with  on  10/15/2024 at 7:30 PM.          EKG:    Performed at: 15:04    I have independently reviewed and interpreted the EKG, along with the final read. EKG also reviewed by Dr. Carlitos Evans.    Ventricular rate 60 bpm  WA interval 158 ms  QRS duration 98 ms  QT/QTc 412/412  P-R-T axes 35 -30 6    Impression: Sinus rhythm; T wave abnormalities in inferior leads no longer present compared to EKG from 11/25/23        Patricia Diaz PA-C  Emergency Medicine  [facility] EMERGENCY DEPARTMENT  [address] [place] [id number]  [phone]  Dept: [phone]       Patricia Diaz PA-C  10/15/24 2029

## 2024-10-15 NOTE — ED TRIAGE NOTES
Patient presents here with shortness of breath with increased activity, such as going up a flight of stairs. Activities with more exertion cause the shortness of breath. He is noting intermittent left side chest pain that has occurred over the past month. Currently pain free at this time. No cough, colds, fevers or respiratory congested noticed.     Hx of bilateral PE one year ago. His blood thinners were discontinued about 5 months ago.      Triage Assessment (Adult)       Row Name 10/15/24 8189          Triage Assessment    Airway WDL WDL        Respiratory WDL    Respiratory WDL WDL        Skin Circulation/Temperature WDL    Skin Circulation/Temperature WDL WDL        Cardiac WDL    Cardiac WDL WDL        Peripheral/Neurovascular WDL    Peripheral Neurovascular WDL WDL        Cognitive/Neuro/Behavioral WDL    Cognitive/Neuro/Behavioral WDL WDL

## 2024-10-16 NOTE — DISCHARGE INSTRUCTIONS
You were seen here today for evaluation of shortness of breath.  Your lab work today is reassuring without evidence of heart attack or significant heart strain.  Your CT scan does unfortunately show mild bilateral pulmonary emboli, these do appear new and are not likely residual from your previous PEs.    Start taking the Xarelto starter pack, you were given a dose tonight before discharge.    Follow-up with your primary care provider as soon as possible.  Return here for any new or worsening symptoms including severe pain, difficulty breathing, chest pain, coughing up blood, or any other symptoms that concern you.

## 2024-10-16 NOTE — ED PROVIDER NOTES
"Emergency Department Midlevel Supervisory Note     I had a face to face encounter with this patient seen by the Advanced Practice Provider (NESS). I personally made/approved the management plan and take responsibility for the patient management. I personally saw patient and performed a substantive portion of the visit including all aspects of the medical decision making.     ED Course:  7:35 PM Patricia Diaz PA-C staffed patient with me. I agree with their assessment and plan of management, and I will see the patient.   I met with the patient to introduce myself, gather additional history, perform my initial exam, and discuss the plan.   7:42 PM It was decided that the patient is ready for discharge.  8:43 PM The patient was discharged.    Brief HPI:     Alan Thapa is a 54 year old male who presents for evaluation of shortness of breath. This began last week with exertion. He says it is \"very mild\" and he also has some mild pain on the left side of his chest. He is concerned because he has a history of PE and says that his symptoms are similar.    I, Kwan Mabry, am serving as a scribe to document services personally performed by Carlitos Evans MD, based on my observations and the provider's statements to me.   I, Carlitos Evans MD attest that Kwan Mabry was acting in a scribe capacity, has observed my performance of the services and has documented them in accordance with my direction.    Brief Physical Exam: /70   Pulse 62   Temp 97.8  F (36.6  C) (Oral)   Resp 13   Ht 1.854 m (6' 1\")   Wt 96.6 kg (213 lb)   SpO2 96%   BMI 28.10 kg/m    Constitutional:  Alert, in no acute distress  EYES: Conjunctivae clear  HENT:  Atraumatic  Respiratory:  Respirations even, unlabored, in no acute respiratory distress  Cardiovascular:  Regular rate and rhythm, good peripheral perfusion  GI: Soft, non-distended, non-tender  Musculoskeletal:  Moves all 4 extremities equally, grossly symmetrical strength, no " significant lower extremity edema  Integument: Warm & dry. No appreciable rash, erythema.  Neurologic:  Alert & oriented, speech clear and fluent, no focal deficits noted  Psych: Normal mood and affect       MDM:  Patient is a 54-year-old male present to the emergency department with shortness of breath that he is noted for the past week or so particular with exertion.  Has noted some intermittent left-sided chest pain as well.  Does have a history of previous PE no longer on Xarelto.    On exam patient's well-appearing and in no acute distress.  Saturating well on room air normotensive with a normal heart rate.    Labs reviewed and interpreted myself.  CBC shows a normal white count and hemoglobin.  No significant metabolic derangements.  Troponin is reassuring at 7 and BNP unremarkable.    Given history of prior PE and symptoms of dyspnea on exertion did obtain a CT of the chest which does show small bilateral pulmonary emboli although no signs of right heart strain on CT scan or signs of heart strain in his labs.    He has remained hemodynamically stable and stable from respiratory standpoint during his time in the emergency department.  Discussed findings and labs with the patient and he feels comfortable with discharge home with initiation of Xarelto.  Signs symptoms worsening condition were discussed return precautions given.  Patient discharged in stable condition.         1. Bilateral pulmonary embolism (H)        Consults:      Labs and Imaging:  Results for orders placed or performed during the hospital encounter of 10/15/24   CT Chest Pulmonary Embolism w Contrast    Impression    IMPRESSION:  1.  Small bilateral pulmonary emboli. These are likely acute, although residual emboli from previous large embolic burden possible.  2.  Results discussed with  on 10/15/2024 at 7:30 PM.   Basic metabolic panel   Result Value Ref Range    Sodium 140 135 - 145 mmol/L    Potassium 4.1 3.4 - 5.3 mmol/L     Chloride 104 98 - 107 mmol/L    Carbon Dioxide (CO2) 24 22 - 29 mmol/L    Anion Gap 12 7 - 15 mmol/L    Urea Nitrogen 17.1 6.0 - 20.0 mg/dL    Creatinine 1.08 0.67 - 1.17 mg/dL    GFR Estimate 82 >60 mL/min/1.73m2    Calcium 9.2 8.8 - 10.4 mg/dL    Glucose 151 (H) 70 - 99 mg/dL   Result Value Ref Range    Troponin T, High Sensitivity 7 <=22 ng/L   Extra Blue Top Tube   Result Value Ref Range    Hold Specimen JIC    Extra Red Top Tube   Result Value Ref Range    Hold Specimen JIC    CBC with platelets and differential   Result Value Ref Range    WBC Count 6.4 4.0 - 11.0 10e3/uL    RBC Count 5.10 4.40 - 5.90 10e6/uL    Hemoglobin 15.0 13.3 - 17.7 g/dL    Hematocrit 42.9 40.0 - 53.0 %    MCV 84 78 - 100 fL    MCH 29.4 26.5 - 33.0 pg    MCHC 35.0 31.5 - 36.5 g/dL    RDW 11.6 10.0 - 15.0 %    Platelet Count 262 150 - 450 10e3/uL    % Neutrophils 65 %    % Lymphocytes 27 %    % Monocytes 6 %    % Eosinophils 2 %    % Basophils 1 %    % Immature Granulocytes 0 %    NRBCs per 100 WBC 0 <1 /100    Absolute Neutrophils 4.2 1.6 - 8.3 10e3/uL    Absolute Lymphocytes 1.7 0.8 - 5.3 10e3/uL    Absolute Monocytes 0.4 0.0 - 1.3 10e3/uL    Absolute Eosinophils 0.1 0.0 - 0.7 10e3/uL    Absolute Basophils 0.0 0.0 - 0.2 10e3/uL    Absolute Immature Granulocytes 0.0 <=0.4 10e3/uL    Absolute NRBCs 0.0 10e3/uL   Result Value Ref Range    Troponin T, High Sensitivity 7 <=22 ng/L   N terminal pro BNP outpatient   Result Value Ref Range    N Terminal Pro BNP Outpatient <36 0 - 900 pg/mL       I have reviewed the relevant laboratory studies above.    I independently interpreted the following imaging study(s):       EKG: I reviewed and independently interpreted the patient's EKG, with comments made as listed below. Please see scanned EKG for full report.   EKG shows a sinus rhythm at 60 beats a minute, left axis deviation, FL normal, QRS 98 ms with incomplete right bundle branch block, no ST elevations or acute ischemic T wave  changes.    Procedures:  I was present for the key portions of procedures documented in NESS/midlevel note, see midlevel note for further details.    Carlitos Evans MD  [facility] EMERGENCY DEPARTMENT  [address] [place] [id number] [phone]       Carlitos Evans MD  10/15/24 5443

## 2024-10-29 ENCOUNTER — TRANSFERRED RECORDS (OUTPATIENT)
Dept: HEALTH INFORMATION MANAGEMENT | Facility: CLINIC | Age: 54
End: 2024-10-29
Payer: COMMERCIAL

## 2024-10-29 ENCOUNTER — DOCUMENTATION ONLY (OUTPATIENT)
Dept: ANTICOAGULATION | Facility: CLINIC | Age: 54
End: 2024-10-29
Payer: COMMERCIAL

## 2024-10-29 NOTE — PROGRESS NOTES
Anticoagulant Therapeutic Duplication    Duplicate orders identified: same medication but different dose, form, frequency or route    The duplicate anticoagulant order(s) has been discontinued    Active anticoagulant: rivaroxaban (Xarelto)    Plan made per [facility] anticoagulation protocol.    Ignacia Alicea RN  10/29/2024

## 2024-11-08 ENCOUNTER — ANCILLARY PROCEDURE (OUTPATIENT)
Dept: CT IMAGING | Facility: CLINIC | Age: 54
End: 2024-11-08
Attending: INTERNAL MEDICINE
Payer: COMMERCIAL

## 2024-11-08 DIAGNOSIS — I26.99 PULMONARY EMBOLISM (H): ICD-10-CM

## 2024-11-08 DIAGNOSIS — I82.409 DEEP VEIN THROMBOSIS (DVT) (H): ICD-10-CM

## 2024-11-08 PROCEDURE — 71275 CT ANGIOGRAPHY CHEST: CPT | Mod: GC | Performed by: RADIOLOGY

## 2024-11-08 PROCEDURE — 74177 CT ABD & PELVIS W/CONTRAST: CPT | Mod: GC | Performed by: RADIOLOGY

## 2024-11-08 RX ORDER — IOPAMIDOL 755 MG/ML
106 INJECTION, SOLUTION INTRAVASCULAR ONCE
Status: COMPLETED | OUTPATIENT
Start: 2024-11-08 | End: 2024-11-08

## 2024-11-08 RX ADMIN — IOPAMIDOL 106 ML: 755 INJECTION, SOLUTION INTRAVASCULAR at 17:50

## 2024-11-08 NOTE — DISCHARGE INSTRUCTIONS

## 2024-11-10 LAB
ATRIAL RATE - MUSE: 60 BPM
DIASTOLIC BLOOD PRESSURE - MUSE: NORMAL MMHG
INTERPRETATION ECG - MUSE: NORMAL
P AXIS - MUSE: 35 DEGREES
PR INTERVAL - MUSE: 158 MS
QRS DURATION - MUSE: 98 MS
QT - MUSE: 412 MS
QTC - MUSE: 412 MS
R AXIS - MUSE: -30 DEGREES
SYSTOLIC BLOOD PRESSURE - MUSE: NORMAL MMHG
T AXIS - MUSE: 6 DEGREES
VENTRICULAR RATE- MUSE: 60 BPM

## 2024-12-26 ENCOUNTER — LAB REQUISITION (OUTPATIENT)
Dept: LAB | Facility: CLINIC | Age: 54
End: 2024-12-26
Payer: COMMERCIAL

## 2024-12-26 DIAGNOSIS — Z12.5 ENCOUNTER FOR SCREENING FOR MALIGNANT NEOPLASM OF PROSTATE: ICD-10-CM

## 2024-12-26 DIAGNOSIS — E78.2 MIXED HYPERLIPIDEMIA: ICD-10-CM

## 2024-12-26 DIAGNOSIS — I27.29 OTHER SECONDARY PULMONARY HYPERTENSION (H): ICD-10-CM

## 2024-12-26 LAB
ALBUMIN SERPL BCG-MCNC: 4.4 G/DL (ref 3.5–5.2)
ALP SERPL-CCNC: 78 U/L (ref 40–150)
ALT SERPL W P-5'-P-CCNC: 48 U/L (ref 0–70)
ANION GAP SERPL CALCULATED.3IONS-SCNC: 14 MMOL/L (ref 7–15)
AST SERPL W P-5'-P-CCNC: 31 U/L (ref 0–45)
BILIRUB SERPL-MCNC: 0.3 MG/DL
BUN SERPL-MCNC: 17.3 MG/DL (ref 6–20)
CALCIUM SERPL-MCNC: 9.1 MG/DL (ref 8.8–10.4)
CHLORIDE SERPL-SCNC: 103 MMOL/L (ref 98–107)
CHOLEST SERPL-MCNC: 163 MG/DL
CREAT SERPL-MCNC: 1.02 MG/DL (ref 0.67–1.17)
EGFRCR SERPLBLD CKD-EPI 2021: 87 ML/MIN/1.73M2
FASTING STATUS PATIENT QL REPORTED: NO
FASTING STATUS PATIENT QL REPORTED: NO
GLUCOSE SERPL-MCNC: 201 MG/DL (ref 70–99)
HCO3 SERPL-SCNC: 21 MMOL/L (ref 22–29)
HDLC SERPL-MCNC: 39 MG/DL
LDLC SERPL CALC-MCNC: 89 MG/DL
NONHDLC SERPL-MCNC: 124 MG/DL
POTASSIUM SERPL-SCNC: 4.1 MMOL/L (ref 3.4–5.3)
PROT SERPL-MCNC: 6.5 G/DL (ref 6.4–8.3)
PSA SERPL DL<=0.01 NG/ML-MCNC: 1.41 NG/ML (ref 0–3.5)
SODIUM SERPL-SCNC: 138 MMOL/L (ref 135–145)
TRIGL SERPL-MCNC: 175 MG/DL

## 2024-12-26 PROCEDURE — G0103 PSA SCREENING: HCPCS | Mod: ORL | Performed by: FAMILY MEDICINE

## 2024-12-26 PROCEDURE — 80061 LIPID PANEL: CPT | Mod: ORL | Performed by: FAMILY MEDICINE

## 2024-12-26 PROCEDURE — 80053 COMPREHEN METABOLIC PANEL: CPT | Mod: ORL | Performed by: FAMILY MEDICINE

## 2025-01-19 ENCOUNTER — HEALTH MAINTENANCE LETTER (OUTPATIENT)
Age: 55
End: 2025-01-19

## 2025-02-17 ENCOUNTER — PATIENT OUTREACH (OUTPATIENT)
Dept: ONCOLOGY | Facility: HOSPITAL | Age: 55
End: 2025-02-17
Payer: COMMERCIAL